# Patient Record
Sex: FEMALE | Race: WHITE | NOT HISPANIC OR LATINO | Employment: FULL TIME | URBAN - NONMETROPOLITAN AREA
[De-identification: names, ages, dates, MRNs, and addresses within clinical notes are randomized per-mention and may not be internally consistent; named-entity substitution may affect disease eponyms.]

---

## 2018-01-05 ENCOUNTER — CONVERSION ENCOUNTER (OUTPATIENT)
Dept: FAMILY MEDICINE CLINIC | Age: 38
End: 2018-01-05

## 2018-01-06 LAB — TSH SERPL-ACNC: 0.06 UIU/ML

## 2018-03-09 ENCOUNTER — CONVERSION ENCOUNTER (OUTPATIENT)
Dept: FAMILY MEDICINE CLINIC | Age: 38
End: 2018-03-09

## 2018-03-10 LAB — TSH SERPL-ACNC: <0.006 UIU/ML

## 2018-04-20 ENCOUNTER — CONVERSION ENCOUNTER (OUTPATIENT)
Dept: FAMILY MEDICINE CLINIC | Age: 38
End: 2018-04-20

## 2018-04-21 LAB — TSH SERPL-ACNC: 0.04 UIU/ML

## 2018-05-25 ENCOUNTER — CONVERSION ENCOUNTER (OUTPATIENT)
Dept: FAMILY MEDICINE CLINIC | Age: 38
End: 2018-05-25

## 2018-05-26 LAB
25(OH)D3 SERPL-MCNC: 35 NG/ML
TSH SERPL-ACNC: 0.01 UIU/ML

## 2018-06-29 ENCOUNTER — OFFICE VISIT CONVERTED (OUTPATIENT)
Dept: FAMILY MEDICINE CLINIC | Age: 38
End: 2018-06-29
Attending: NURSE PRACTITIONER

## 2018-07-30 ENCOUNTER — CONVERSION ENCOUNTER (OUTPATIENT)
Dept: FAMILY MEDICINE CLINIC | Age: 38
End: 2018-07-30

## 2018-07-31 LAB — TSH SERPL-ACNC: 0.01 UIU/ML

## 2018-09-28 ENCOUNTER — CONVERSION ENCOUNTER (OUTPATIENT)
Dept: FAMILY MEDICINE CLINIC | Age: 38
End: 2018-09-28

## 2018-09-29 LAB — TSH SERPL-ACNC: 0.02 UIU/ML

## 2018-11-20 ENCOUNTER — CONVERSION ENCOUNTER (OUTPATIENT)
Dept: FAMILY MEDICINE CLINIC | Age: 38
End: 2018-11-20

## 2018-11-21 LAB — TSH SERPL-ACNC: 41.78 UIU/ML

## 2018-12-07 ENCOUNTER — OFFICE VISIT CONVERTED (OUTPATIENT)
Dept: FAMILY MEDICINE CLINIC | Age: 38
End: 2018-12-07
Attending: NURSE PRACTITIONER

## 2019-01-04 ENCOUNTER — HOSPITAL ENCOUNTER (OUTPATIENT)
Dept: OTHER | Facility: HOSPITAL | Age: 39
Discharge: HOME | End: 2019-01-04

## 2019-02-01 ENCOUNTER — OFFICE VISIT CONVERTED (OUTPATIENT)
Dept: FAMILY MEDICINE CLINIC | Age: 39
End: 2019-02-01
Attending: NURSE PRACTITIONER

## 2019-02-01 ENCOUNTER — HOSPITAL ENCOUNTER (OUTPATIENT)
Dept: OTHER | Facility: HOSPITAL | Age: 39
Discharge: HOME | End: 2019-02-01

## 2019-02-01 LAB
ALBUMIN SERPL-MCNC: 4.3 G/DL (ref 3.5–5)
ALBUMIN/GLOB SERPL: 1.2 {RATIO} (ref 1.4–2.6)
ALP SERPL-CCNC: 57 U/L (ref 42–98)
ALT SERPL-CCNC: 22 U/L (ref 10–40)
ANION GAP SERPL CALC-SCNC: 17 MMOL/L (ref 8–19)
AST SERPL-CCNC: 21 U/L (ref 15–50)
BASOPHILS # BLD MANUAL: 0.07 10*3/UL (ref 0–0.2)
BASOPHILS NFR BLD MANUAL: 1.1 % (ref 0–3)
BILIRUB SERPL-MCNC: 0.35 MG/DL (ref 0.2–1.3)
BUN SERPL-MCNC: 9 MG/DL (ref 5–25)
BUN/CREAT SERPL: 9 {RATIO} (ref 6–20)
CALCIUM SERPL-MCNC: 10.5 MG/DL (ref 8.7–10.4)
CHLORIDE SERPL-SCNC: 102 MMOL/L (ref 99–111)
CONV CO2: 25 MMOL/L (ref 22–32)
CONV TOTAL PROTEIN: 7.9 G/DL (ref 6.3–8.2)
CREAT UR-MCNC: 0.99 MG/DL (ref 0.5–0.9)
DEPRECATED RDW RBC AUTO: 46.4 FL
EOSINOPHIL # BLD MANUAL: 0.31 10*3/UL (ref 0–0.7)
EOSINOPHIL NFR BLD MANUAL: 4.8 % (ref 0–7)
ERYTHROCYTE [DISTWIDTH] IN BLOOD BY AUTOMATED COUNT: 15.1 % (ref 11.5–14.5)
GFR SERPLBLD BASED ON 1.73 SQ M-ARVRAT: >60 ML/MIN/{1.73_M2}
GLOBULIN UR ELPH-MCNC: 3.6 G/DL (ref 2–3.5)
GLUCOSE SERPL-MCNC: 92 MG/DL (ref 65–99)
GRANS (ABSOLUTE): 2.91 10*3/UL (ref 2–8)
GRANS: 45.4 % (ref 30–85)
HBA1C MFR BLD: 13.3 G/DL (ref 12–16)
HCT VFR BLD AUTO: 40.4 % (ref 37–47)
IMM GRANULOCYTES # BLD: 0.01 10*3/UL (ref 0–0.54)
IMM GRANULOCYTES NFR BLD: 0.2 % (ref 0–0.43)
LYMPHOCYTES # BLD MANUAL: 2.39 10*3/UL (ref 1–5)
LYMPHOCYTES NFR BLD MANUAL: 11.2 % (ref 3–10)
MCH RBC QN AUTO: 27.8 PG (ref 27–31)
MCHC RBC AUTO-ENTMCNC: 32.9 G/DL (ref 33–37)
MCV RBC AUTO: 84.3 FL (ref 81–99)
MONOCYTES # BLD AUTO: 0.72 10*3/UL (ref 0.2–1.2)
OSMOLALITY SERPL CALC.SUM OF ELEC: 286 MOSM/KG (ref 273–304)
PLATELET # BLD AUTO: 349 10*3/UL (ref 130–400)
PMV BLD AUTO: 9.4 FL (ref 7.4–10.4)
POTASSIUM SERPL-SCNC: 4.5 MMOL/L (ref 3.5–5.3)
RBC # BLD AUTO: 4.79 10*6/UL (ref 4.2–5.4)
SODIUM SERPL-SCNC: 139 MMOL/L (ref 135–147)
TSH SERPL-ACNC: 15.66 M[IU]/L (ref 0.27–4.2)
VARIANT LYMPHS NFR BLD MANUAL: 37.3 % (ref 20–45)
WBC # BLD AUTO: 6.41 10*3/UL (ref 4.8–10.8)

## 2019-02-05 LAB
CHOLEST SERPL-MCNC: 245 MG/DL (ref 107–200)
CHOLEST/HDLC SERPL: 7.7 {RATIO} (ref 3–6)
HDLC SERPL-MCNC: 32 MG/DL (ref 40–60)
LDLC SERPL CALC-MCNC: 156 MG/DL (ref 70–100)
TRIGL SERPL-MCNC: 285 MG/DL (ref 40–150)
VLDLC SERPL-MCNC: 57 MG/DL (ref 5–37)

## 2019-03-29 ENCOUNTER — HOSPITAL ENCOUNTER (OUTPATIENT)
Dept: OTHER | Facility: HOSPITAL | Age: 39
Discharge: HOME | End: 2019-03-29

## 2019-03-29 LAB — TSH SERPL-ACNC: 0.22 M[IU]/L (ref 0.27–4.2)

## 2019-05-29 ENCOUNTER — HOSPITAL ENCOUNTER (OUTPATIENT)
Dept: OTHER | Facility: HOSPITAL | Age: 39
Discharge: HOME | End: 2019-05-29

## 2019-05-29 LAB — TSH SERPL-ACNC: 0.18 M[IU]/L (ref 0.27–4.2)

## 2019-07-12 ENCOUNTER — HOSPITAL ENCOUNTER (OUTPATIENT)
Dept: OTHER | Facility: HOSPITAL | Age: 39
Discharge: HOME | End: 2019-07-12

## 2019-07-12 LAB — TSH SERPL-ACNC: 1.23 M[IU]/L (ref 0.27–4.2)

## 2019-10-28 ENCOUNTER — HOSPITAL ENCOUNTER (OUTPATIENT)
Dept: OTHER | Facility: HOSPITAL | Age: 39
Discharge: HOME | End: 2019-10-28

## 2019-10-28 ENCOUNTER — OFFICE VISIT CONVERTED (OUTPATIENT)
Dept: FAMILY MEDICINE CLINIC | Age: 39
End: 2019-10-28
Attending: NURSE PRACTITIONER

## 2019-10-28 LAB — TSH SERPL-ACNC: 2.49 M[IU]/L (ref 0.27–4.2)

## 2020-01-17 ENCOUNTER — OFFICE VISIT CONVERTED (OUTPATIENT)
Dept: FAMILY MEDICINE CLINIC | Age: 40
End: 2020-01-17
Attending: NURSE PRACTITIONER

## 2020-01-29 ENCOUNTER — HOSPITAL ENCOUNTER (OUTPATIENT)
Dept: OTHER | Facility: HOSPITAL | Age: 40
Discharge: HOME | End: 2020-01-29
Attending: NURSE PRACTITIONER

## 2020-01-29 LAB
25(OH)D3 SERPL-MCNC: 43.5 NG/ML (ref 30–100)
ALBUMIN SERPL-MCNC: 4 G/DL (ref 3.5–5)
ALBUMIN/GLOB SERPL: 1.3 {RATIO} (ref 1.4–2.6)
ALP SERPL-CCNC: 51 U/L (ref 42–98)
ALT SERPL-CCNC: 21 U/L (ref 10–40)
ANION GAP SERPL CALC-SCNC: 16 MMOL/L (ref 8–19)
AST SERPL-CCNC: 17 U/L (ref 15–50)
BILIRUB SERPL-MCNC: 0.33 MG/DL (ref 0.2–1.3)
BUN SERPL-MCNC: 9 MG/DL (ref 5–25)
BUN/CREAT SERPL: 9 {RATIO} (ref 6–20)
CALCIUM SERPL-MCNC: 10.8 MG/DL (ref 8.7–10.4)
CHLORIDE SERPL-SCNC: 101 MMOL/L (ref 99–111)
CONV CO2: 23 MMOL/L (ref 22–32)
CONV TOTAL PROTEIN: 7 G/DL (ref 6.3–8.2)
CREAT UR-MCNC: 1.02 MG/DL (ref 0.5–0.9)
GFR SERPLBLD BASED ON 1.73 SQ M-ARVRAT: >60 ML/MIN/{1.73_M2}
GLOBULIN UR ELPH-MCNC: 3 G/DL (ref 2–3.5)
GLUCOSE SERPL-MCNC: 107 MG/DL (ref 65–99)
OSMOLALITY SERPL CALC.SUM OF ELEC: 281 MOSM/KG (ref 273–304)
POTASSIUM SERPL-SCNC: 4 MMOL/L (ref 3.5–5.3)
SODIUM SERPL-SCNC: 136 MMOL/L (ref 135–147)
T4 FREE SERPL-MCNC: 0.6 NG/DL (ref 0.9–1.8)
TSH SERPL-ACNC: 12.13 M[IU]/L (ref 0.27–4.2)

## 2020-01-31 LAB
ASO AB SERPL-ACNC: 242 [IU]/ML (ref 0–200)
CONV RHEUMATOID FACTOR IGM: <10 [IU]/ML (ref 0–14)
CRP SERPL-MCNC: 1.2 MG/L (ref 0–5)
DSDNA AB SER-ACNC: NEGATIVE [IU]/ML
ENA AB SER IA-ACNC: NEGATIVE {RATIO}
ERYTHROCYTE [SEDIMENTATION RATE] IN BLOOD: 13 MM/H (ref 0–20)
PHOSPHATE SERPL-MCNC: 2.9 MG/DL (ref 2.4–4.5)
URATE SERPL-MCNC: 6.3 MG/DL (ref 2.5–7.5)

## 2020-02-01 LAB — T3FREE SERPL-MCNC: 2.8 PG/ML (ref 2–4.4)

## 2020-02-07 ENCOUNTER — HOSPITAL ENCOUNTER (OUTPATIENT)
Dept: OTHER | Facility: HOSPITAL | Age: 40
Discharge: HOME | End: 2020-02-07
Attending: NURSE PRACTITIONER

## 2020-05-29 ENCOUNTER — OFFICE VISIT CONVERTED (OUTPATIENT)
Dept: FAMILY MEDICINE CLINIC | Age: 40
End: 2020-05-29
Attending: NURSE PRACTITIONER

## 2020-07-28 ENCOUNTER — HOSPITAL ENCOUNTER (OUTPATIENT)
Dept: OTHER | Facility: HOSPITAL | Age: 40
Discharge: HOME | End: 2020-07-28
Attending: DERMATOLOGY

## 2020-07-28 LAB
ALBUMIN SERPL-MCNC: 4 G/DL (ref 3.5–5)
ALBUMIN/GLOB SERPL: 1.3 {RATIO} (ref 1.4–2.6)
ALP SERPL-CCNC: 57 U/L (ref 42–98)
ALT SERPL-CCNC: 24 U/L (ref 10–40)
ANION GAP SERPL CALC-SCNC: 17 MMOL/L (ref 8–19)
ASO AB SERPL-ACNC: 197 [IU]/ML (ref 0–200)
AST SERPL-CCNC: 16 U/L (ref 15–50)
BILIRUB SERPL-MCNC: <0.15 MG/DL (ref 0.2–1.3)
BUN SERPL-MCNC: 7 MG/DL (ref 5–25)
BUN/CREAT SERPL: 7 {RATIO} (ref 6–20)
CALCIUM SERPL-MCNC: 10.9 MG/DL (ref 8.7–10.4)
CHLORIDE SERPL-SCNC: 101 MMOL/L (ref 99–111)
CHOLEST SERPL-MCNC: 238 MG/DL (ref 107–200)
CHOLEST/HDLC SERPL: 7.2 {RATIO} (ref 3–6)
CONV CO2: 23 MMOL/L (ref 22–32)
CONV TOTAL PROTEIN: 7 G/DL (ref 6.3–8.2)
CREAT UR-MCNC: 0.94 MG/DL (ref 0.5–0.9)
GFR SERPLBLD BASED ON 1.73 SQ M-ARVRAT: >60 ML/MIN/{1.73_M2}
GLOBULIN UR ELPH-MCNC: 3 G/DL (ref 2–3.5)
GLUCOSE SERPL-MCNC: 113 MG/DL (ref 65–99)
HDLC SERPL-MCNC: 33 MG/DL (ref 40–60)
LDLC SERPL CALC-MCNC: 150 MG/DL (ref 70–100)
OSMOLALITY SERPL CALC.SUM OF ELEC: 283 MOSM/KG (ref 273–304)
POTASSIUM SERPL-SCNC: 4.1 MMOL/L (ref 3.5–5.3)
PTH-INTACT SERPL-MCNC: 89.8 PG/ML (ref 11.1–79.5)
SODIUM SERPL-SCNC: 137 MMOL/L (ref 135–147)
T4 FREE SERPL-MCNC: 0.4 NG/DL (ref 0.9–1.8)
TRIGL SERPL-MCNC: 275 MG/DL (ref 40–150)
TSH SERPL-ACNC: 67.58 M[IU]/L (ref 0.27–4.2)
VLDLC SERPL-MCNC: 55 MG/DL (ref 5–37)

## 2020-07-29 LAB
BASOPHILS # BLD MANUAL: 0.08 10*3/UL (ref 0–0.2)
BASOPHILS NFR BLD MANUAL: 0.9 % (ref 0–3)
DEPRECATED RDW RBC AUTO: 44 FL
EOSINOPHIL # BLD MANUAL: 0.31 10*3/UL (ref 0–0.7)
EOSINOPHIL NFR BLD MANUAL: 3.6 % (ref 0–7)
ERYTHROCYTE [DISTWIDTH] IN BLOOD BY AUTOMATED COUNT: 14.8 % (ref 11.5–14.5)
GRANS (ABSOLUTE): 4.68 10*3/UL (ref 2–8)
GRANS: 53.8 % (ref 30–85)
HBA1C MFR BLD: 11.7 G/DL (ref 12–16)
HCT VFR BLD AUTO: 35.9 % (ref 37–47)
IMM GRANULOCYTES # BLD: 0.04 10*3/UL (ref 0–0.54)
IMM GRANULOCYTES NFR BLD: 0.5 % (ref 0–0.43)
LYMPHOCYTES # BLD MANUAL: 2.68 10*3/UL (ref 1–5)
LYMPHOCYTES NFR BLD MANUAL: 10.3 % (ref 3–10)
MCH RBC QN AUTO: 26.8 PG (ref 27–31)
MCHC RBC AUTO-ENTMCNC: 32.6 G/DL (ref 33–37)
MCV RBC AUTO: 82.2 FL (ref 81–99)
MONOCYTES # BLD AUTO: 0.89 10*3/UL (ref 0.2–1.2)
PLATELET # BLD AUTO: 421 10*3/UL (ref 130–400)
PMV BLD AUTO: 9.6 FL (ref 7.4–10.4)
RBC # BLD AUTO: 4.37 10*6/UL (ref 4.2–5.4)
T3FREE SERPL-MCNC: 2 PG/ML (ref 2–4.4)
VARIANT LYMPHS NFR BLD MANUAL: 30.9 % (ref 20–45)
WBC # BLD AUTO: 8.68 10*3/UL (ref 4.8–10.8)

## 2020-07-30 LAB — 25(OH)D3 SERPL-MCNC: 84.5 NG/ML (ref 30–100)

## 2020-09-16 ENCOUNTER — HOSPITAL ENCOUNTER (OUTPATIENT)
Dept: OTHER | Facility: HOSPITAL | Age: 40
Discharge: HOME | End: 2020-09-16
Attending: INTERNAL MEDICINE

## 2020-09-16 LAB
T4 FREE SERPL-MCNC: 1.3 NG/DL (ref 0.9–1.8)
TSH SERPL-ACNC: 13.55 M[IU]/L (ref 0.27–4.2)

## 2020-10-19 ENCOUNTER — HOSPITAL ENCOUNTER (OUTPATIENT)
Dept: OTHER | Facility: HOSPITAL | Age: 40
Discharge: HOME | End: 2020-10-19
Attending: FAMILY MEDICINE

## 2020-10-22 LAB — SARS-COV-2 RNA SPEC QL NAA+PROBE: NOT DETECTED

## 2020-12-23 ENCOUNTER — HOSPITAL ENCOUNTER (OUTPATIENT)
Dept: OTHER | Facility: HOSPITAL | Age: 40
Discharge: HOME | End: 2020-12-23
Attending: INTERNAL MEDICINE

## 2020-12-23 LAB
T4 FREE SERPL-MCNC: 1.2 NG/DL (ref 0.9–1.8)
TSH SERPL-ACNC: 2.68 M[IU]/L (ref 0.27–4.2)

## 2021-01-26 ENCOUNTER — OFFICE VISIT CONVERTED (OUTPATIENT)
Dept: FAMILY MEDICINE CLINIC | Age: 41
End: 2021-01-26
Attending: NURSE PRACTITIONER

## 2021-04-23 ENCOUNTER — OFFICE VISIT CONVERTED (OUTPATIENT)
Dept: FAMILY MEDICINE CLINIC | Age: 41
End: 2021-04-23
Attending: NURSE PRACTITIONER

## 2021-05-14 ENCOUNTER — OFFICE VISIT CONVERTED (OUTPATIENT)
Dept: FAMILY MEDICINE CLINIC | Age: 41
End: 2021-05-14
Attending: FAMILY MEDICINE

## 2021-05-18 NOTE — PROGRESS NOTES
Alysa Qiu  1980     Office/Outpatient Visit    Visit Date: Fri, May 14, 2021 03:55 pm    Provider: Ann Melvin MD (Assistant: Essie Pascual MA)    Location: [facility]        Electronically signed by Ann Melvin MD on  05/14/2021 05:26:28 PM                             Subjective:        CC: Mrs. Qiu is a 40 year old White female.  PAtient presents today to discuss med issues;         HPI: Alysa is here today to establish care with me.          She is on levothyroxine for hypothyroidism.  No heat/cold intolerance, no changes in hair or skin.  She is following with Dr. Dave Lord.  He is currently managing meds.        She is on spironolactone for acne.  That works pretty well for her.        She is on pantoprazole and sucralfate for GERD.  She is taking these just as needed.  Has not needed them regularly recently.  No N/V/D.  No abd pain.        She is on escitalopram and bupropion for depression.  She would like to go back down on the bupropion to 150 mg.  When she went up to the 300 mg dose, she started to notice some emotional numbing side effects.    ROS:     CONSTITUTIONAL:  Negative for fatigue and fever.      EYES:  Negative for blurred vision.      CARDIOVASCULAR:  Negative for chest pain and palpitations.      RESPIRATORY:  Negative for recent cough and dyspnea.      GASTROINTESTINAL:  Negative for abdominal pain, constipation, diarrhea, nausea and vomiting.      MUSCULOSKELETAL:  Negative for arthralgias and myalgias.      NEUROLOGICAL:  Negative for paresthesias and weakness.      PSYCHIATRIC:  Positive for anxiety and emotional numbing.   Negative for depression or sleep disturbance.          Past Medical History / Family History / Social History:         Last Reviewed on 5/14/2021 05:20 PM by Ann Melvin    Past Medical History:                 PAST MEDICAL HISTORY     UNREMARKABLE         GYNECOLOGICAL HISTORY:    Current method of contraception  is IUD;         CURRENT MEDICAL PROVIDERS: Endocrinologist dr cabezas         PREVENTIVE HEALTH MAINTENANCE             MAMMOGRAM: Done within last 2 years and results in are chart was last done 2020 with normal results     PAP SMEAR: was last done 2019         Surgical History:         Culposcopy 19;         Family History:         Positive for Hyperlipidemia ( father; mother ).          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 Qiu denies any current form of exercise.          Tobacco/Alcohol/Supplements:     Last Reviewed on 2021 05:20 PM by Ann Melvin    Tobacco: Current Smoker: She currently smokes every day, 1-5 cigarettes per day; 1/2 pack per day.          Substance Abuse History:     Last Reviewed on 2021 05:20 PM by Ann Melvin        Mental Health History:     Last Reviewed on 2021 05:20 PM by Ann Melvin        Communicable Diseases (eg STDs):     Last Reviewed on 2021 05:20 PM by Ann Melvin        Current Problems:     Last Reviewed on 2021 04:50 PM by Kate Ramsey    Hypothyroidism, unspecified    Vitamin D deficiency, unspecified    Dysthymic disorder    Acne vulgaris    Gastro-esophageal reflux disease without esophagitis    Vitamin B12 deficiency anemia due to intrinsic factor deficiency    Overweight    Other long term (current) drug therapy    Hyperlipidemia, unspecified    Vitamin B12 deficiency anemia, unspecified    Acute abdomen        Immunizations:     None        Allergies:     Last Reviewed on 2021 04:50 PM by Kate Ramsey    No Known Allergies.        Current Medications:     Last Reviewed on 2021 04:50 PM by Kate Ramsey    zinc 50 mg oral tablet    CalcidoL 200 mcg/mL (8,000 unit/mL) oral Drops    levothyroxine 150 mcg oral tablet [take 1 tablet (150 mcg) by oral route once daily]    spironolactone 50 mg oral  tablet [Take 1 tablet(s) by mouth daily]    escitalopram oxalate 20 mg oral tablet [TAKE ONE TABLET BY MOUTH DAILY]    buPROPion  mg oral Tablet, Extended Release 24 hr [take 1 tablet (300 mg) by oral route once daily]    Carafate 1 gram oral tablet [take 1 tablet (1 gram) by oral route 2 times per day on an empty stomach]    pantoprazole 40 mg oral tablet, delayed release (enteric coated) [take 1 tablet (40 mg) by oral route daily x  2 weeks]        Objective:        Vitals:         Current: 5/14/2021 4:30:09 PM    Ht:  5 ft, 7 in;  Wt: 199.8 lbs;  BMI: 31.3T: 97.5 F (temporal);  BP: 124/69 mm Hg (left arm, sitting);  P: 94 bpm (left arm (BP Cuff), sitting);  sCr: 0.94 mg/dL;  GFR: 89.94        Exams:     PHYSICAL EXAM:     GENERAL: vital signs recorded - well developed, well nourished;  well groomed;  no apparent distress;     EYES: extraocular movements intact; conjunctiva and cornea are normal; PERRLA;     RESPIRATORY: normal respiratory rate and pattern with no distress; normal breath sounds with no rales, rhonchi, wheezes or rubs;     CARDIOVASCULAR: normal rate; rhythm is regular;  no systolic murmur; no edema;     GASTROINTESTINAL: nontender; normal bowel sounds;     MUSCULOSKELETAL: normal gait; normal overall tone         Assessment:         E03.9   Hypothyroidism, unspecified       F33.1   Major depressive disorder, recurrent, moderate       K21.9   Gastro-esophageal reflux disease without esophagitis       L70.0   Acne vulgaris           ORDERS:         Meds Prescribed:       [Queued Refill] buPROPion  mg oral Tablet, Extended Release 24 hr [take 1 tablet (150 mg) by oral route once daily], #90 (ninety) tablets, Refills: 1 (one)         Lab Orders:       APPTO  Appointment need  (In-House)                      Plan:         Hypothyroidism, unspecifiedShe follows with Endo Dr. Dave Lord for this.  He is managing her meds.  Going to see him soon and planning to get labs at that time.  RTC  3 months.        She is overdue for pap smear.  She had a colposcopy in 2019 with [facility] and they recommended pap smear in 1 year, so we will get that set up for her.        FOLLOW-UP: Schedule a follow-up visit in 3 months.:.  WWE 30 min with Ngozi          Orders:       APPTO  Appointment need  (In-House)              Major depressive disorder, recurrent, moderateShe is having emotional numbing with the bupropion at 300 mg.  She would like to go back down to 150 mg (rx sent).  Continue escitalopram 20 mg daily.  She is also doing some other relaxation techniques at home to help with her anxiety and depression, and these do seem to be helping.  Continue to monitor.          Prescriptions:       [Queued Refill] buPROPion  mg oral Tablet, Extended Release 24 hr [take 1 tablet (150 mg) by oral route once daily], #90 (ninety) tablets, Refills: 1 (one)         Gastro-esophageal reflux disease without esophagitisDoing much better.  She is now down to prn use of the pantoprazole 40 mg daily and sucralfate 1 gm.  No refills needed.        Acne vulgarisStable on spironolactone 50 mg daily.  Plan to check labs at E in 3 months.            Patient Recommendations:        For  Hypothyroidism, unspecified:    Schedule a follow-up visit in 3 months.                APPOINTMENT INFORMATION:        Monday Tuesday Wednesday Thursday Friday Saturday Sunday            Time:___________________AM  PM   Date:_____________________             Charge Capture:         Primary Diagnosis:     E03.9  Hypothyroidism, unspecified           Orders:      [id number]  Office/outpatient visit; established patient, level 4  (In-House)            APPTO  Appointment need  (In-House)              F33.1  Major depressive disorder, recurrent, moderate     K21.9  Gastro-esophageal reflux disease without esophagitis     L70.0  Acne vulgaris

## 2021-05-18 NOTE — PROGRESS NOTES
Alysa Qiu 1980     Office/Outpatient Visit    Visit Date: Mon, Oct 28, 2019 03:44 pm    Provider: Alice Mistry N.P. (Assistant: Prema Etienne MA)    Location: [facility]        Electronically signed by Alice Mistry N.P. on  11/08/2019 10:44:53 AM                             SUBJECTIVE:        CC: (NOT TAKING DIAZEPAM)     Mrs. Qiu is a 39 year old White female.  This is a follow-up visit.  check up, medication refills;         HPI:         Patient to be evaluated for hypothyroidism.  Pt states doing well on med. Here for f/u and refills.          In regard to the depression with anxiety, pt states her son committed suicide recently and she has been depressed over it. He lived with his dad. She is now questioning everything and her decisions with parenting him. She is currently in counseling.  However, she has been off work. She is concerned about going back and when to go back. She works for [employer] who has been great for her. Pt states knowing that some work will be good for her to keep her mind off things. Her mom also works there and can oversee her.     ROS:     CONSTITUTIONAL:  Negative for chills, fatigue, fever, and weight change.      EYES:  Negative for blurred vision.      CARDIOVASCULAR:  Negative for chest pain, orthopnea, paroxysmal nocturnal dyspnea and pedal edema.      RESPIRATORY:  Negative for dyspnea.      GASTROINTESTINAL:  Negative for abdominal pain, constipation, diarrhea, nausea and vomiting.      NEUROLOGICAL:  Negative for dizziness, headaches, paresthesias, and weakness.      PSYCHIATRIC:  Positive for anxiety, crying spells and depression.          PMH/FMH/SH:     Last Reviewed on 2/01/2019 01:15 PM by Alice Mistry    Past Medical History:                 PAST MEDICAL HISTORY     UNREMARKABLE         PREVENTIVE HEALTH MAINTENANCE             MAMMOGRAM: Done within last 2 years and results in are chart was last done 1/2019 with normal  results         Surgical History:         Culposcopy 1-24-19;         Social History:     Occupation:  [employer];     Marital Status:      Mrs. Qiu denies any current form of exercise.          Tobacco/Alcohol/Supplements:     Last Reviewed on 2/01/2019 01:15 PM by Alice Mistry    Tobacco: Current Smoker: She currently smokes every day, 1/2 pack per day.          Substance Abuse History:     Last Reviewed on 2/01/2019 01:15 PM by Alice Mistry        Mental Health History:     Last Reviewed on 2/01/2019 01:15 PM by Alice Mistry        Communicable Diseases (eg STDs):     Last Reviewed on 2/01/2019 01:15 PM by Alice Mistry            Current Problems:     Last Reviewed on 2/01/2019 01:15 PM by Alice Mistry    Overweight     Vitamin B12 deficiency     Screening for hyperlipidemia     GERD     Low back pain     Acne     Depression with anxiety     Hashimoto's thyroiditis     Vitamin D deficiency, unspecified     Hypothyroidism     Screening for depression         Immunizations:     None        Allergies:     Last Reviewed on 2/01/2019 01:15 PM by Alice Mistry      No Known Drug Allergies.         Current Medications:     Last Reviewed on 2/01/2019 01:15 PM by Alice Mistry    Spironolactone 50mg Tablet Take 1 tablet(s) by mouth daily     Bupropion HCl 150mg Tablets, Extended Release 1 tab daily     Lexapro 10mg Tablet 1 tab daily     NP Thyroid 90mg Tablet Take 1 tablet(s) by mouth daily     Diazepam 5mg Tablet 1/2 pill tid prn anxiety.         OBJECTIVE:        Vitals:         Current: 10/28/2019 3:49:57 PM    Ht:  5 ft, 7 in;  Wt: 194.2 lbs;  BMI: 30.4    T: 97.9 F (oral);  BP: 125/72 mm Hg (left arm, sitting);  P: 79 bpm (left arm (BP Cuff), sitting);  sCr: 0.99 mg/dL;  GFR: 85.21        Exams:     PHYSICAL EXAM:     GENERAL: vital signs recorded - well developed, well nourished;  no apparent distress;     EYES: extraocular movements intact; conjunctiva and  cornea are normal; PERRLA;     E/N/T:  normal EACs, TMs, nasal/oral mucosa, teeth, gingiva, and oropharynx;     NECK: range of motion is normal; thyroid is non-palpable;     RESPIRATORY: normal respiratory rate and pattern with no distress; normal breath sounds with no rales, rhonchi, wheezes or rubs;     CARDIOVASCULAR: normal rate; rhythm is regular;  no systolic murmur; no edema;     GASTROINTESTINAL: nontender; normal bowel sounds; no organomegaly;     NEUROLOGICAL:  cranial nerves, motor and sensory function, reflexes, gait and coordination are all intact;     PSYCHIATRIC:  appropriate affect and demeanor; normal speech pattern; grossly normal memory;         ASSESSMENT:           244.9   E03.9  Hypothyroidism              DDx:     300.4   F34.1  Depression with anxiety              DDx:         ORDERS:         Meds Prescribed:       Refill of: Lexapro (Escitalopram Oxalate) 20mg Tablet 1 tab daily  #30 (Thirty) tablet(s) Refills: 0         Lab Orders:       [id number]  TSH - [facility] TSH  (Send-Out)                   PLAN:          Hypothyroidism     LABORATORY:  Labs ordered to be performed today include TSH.            Orders:       [id number]  TSH - [facility] TSH  (Send-Out)            Depression with anxiety Long discussion on situation and depression.     School/Work Excuse for Return to work Nov 12th           Prescriptions:       Refill of: Lexapro (Escitalopram Oxalate) 20mg Tablet 1 tab daily  #30 (Thirty) tablet(s) Refills: 0             CHARGE CAPTURE:           Primary Diagnosis:     244.9 Hypothyroidism            E03.9    Hypothyroidism, unspecified              Orders:          [id number]   Office/outpatient visit; established patient, level 3  (In-House)           300.4 Depression with anxiety            F34.1    Dysthymic disorder

## 2021-05-18 NOTE — PROGRESS NOTES
Alysa Qiu 1980     Office/Outpatient Visit    Visit Date: Fri, Feb 1, 2019 01:01 pm    Provider: Alice Mistry N.P. (Assistant: Prema Etienne MA)    Location: [facility]        Electronically signed by Alice Mistry N.P. on  02/18/2019 08:36:59 AM                             SUBJECTIVE:        CC: (NOT TAKING DIAZEPAM)     Mrs. Qiu is a 38 year old White female.  This is a follow-up visit.  prevenative exam. medication refills. Patient also states she has chest congestion, pressure and cough onset 4 days;         HPI:         Mrs. Qiu presents with health checkup.  Her last physical exam was 1 year ago.  Her last menstrual period was 1/2019.  She is not currently using any form of contraception.  She performs breast self-exams occasionally.    Preventative Health updated today.  She is current with her influenza immunization.  Mrs. Qiu has had an abnormal Pap smear in the past.  Tobacco:         PHQ-9 Depression Screening: Completed form scanned and in chart; Total Score 0 Alcohol Consumption Screening: Completed form scanned and in chart; Total Score 0         Dx with cough; states sinus congestion, headache, ear pain, sore throat. Taking nyquil and dayqul, clartin D, EmergenC and vitamin with no relief.          Hypothyroidism details; states doing well on med. Here for f/u and labs.      ROS:     CONSTITUTIONAL:  Negative for chills, fatigue, fever, and weight change.      EYES:  Negative for blurred vision.      CARDIOVASCULAR:  Negative for chest pain, orthopnea, paroxysmal nocturnal dyspnea and pedal edema.      RESPIRATORY:  Positive for recent cough.   Negative for dyspnea.      GASTROINTESTINAL:  Negative for abdominal pain, constipation, diarrhea, nausea and vomiting.      NEUROLOGICAL:  Negative for dizziness, headaches, paresthesias, and weakness.      PSYCHIATRIC:  Negative for anxiety, depression, and sleep disturbances.          PMH/FMH/SH:     Last  Reviewed on 2/01/2019 01:15 PM by Alice Mistry    Past Medical History:                 PAST MEDICAL HISTORY     UNREMARKABLE         PREVENTIVE HEALTH MAINTENANCE             MAMMOGRAM: Done within last 2 years and results in are chart was last done 1/2019 with normal results         Surgical History:     NONE         Social History:     Occupation:  [employer];     Marital Status:      Mrs. Qiu denies any current form of exercise.          Tobacco/Alcohol/Supplements:     Last Reviewed on 2/01/2019 01:15 PM by Alice Mistry    Tobacco: Current Smoker: She currently smokes every day, 1/2 pack per day.          Substance Abuse History:     Last Reviewed on 2/01/2019 01:15 PM by Alice Mistry        Mental Health History:     Last Reviewed on 2/01/2019 01:15 PM by Alice Mistry        Communicable Diseases (eg STDs):     Last Reviewed on 2/01/2019 01:15 PM by Alice Mistry            Current Problems:     Last Reviewed on 2/01/2019 01:15 PM by Alice Mistry    Overweight     Vitamin B12 deficiency     Screening for hyperlipidemia     GERD     Low back pain     Acne     Depression with anxiety     Hashimoto's thyroiditis     Vitamin D deficiency, unspecified     Hypothyroidism     Cough     Screening for breast cancer, unspecified     Screening for depression         Immunizations:     None        Allergies:     Last Reviewed on 2/01/2019 01:15 PM by Alice Mistry      No Known Drug Allergies.         Current Medications:     Last Reviewed on 2/01/2019 01:15 PM by Alice Mistry    Spironolactone 50mg Tablet Take 1 tablet(s) by mouth daily     Bupropion HCl 150mg Tablets, Extended Release 1 tab daily     Lexapro 10mg Tablet 1 tab daily     Diazepam 5mg Tablet 1/2 pill tid prn anxiety.         OBJECTIVE:        Vitals:         Current: 2/1/2019 1:07:04 PM    Ht:  5 ft, 7 in;  Wt: 191.4 lbs;  BMI: 30.0    T: 98.7 F (oral);  BP: 112/71 mm Hg (left arm, sitting);  P:  78 bpm (left arm (BP Cuff), sitting);  sCr: 0.89 mg/dL;  GFR: 95.13        Exams:     PHYSICAL EXAM:     GENERAL: vital signs recorded - well developed, well nourished;  no apparent distress;     EYES: extraocular movements intact; conjunctiva and cornea are normal; PERRLA;     E/N/T: OROPHARYNX: oral mucosa reveals erythema;     NECK: range of motion is normal; thyroid is non-palpable;     RESPIRATORY: normal respiratory rate and pattern with no distress; normal breath sounds with no rales, rhonchi, wheezes or rubs;     CARDIOVASCULAR: normal rate; rhythm is regular;  no systolic murmur; no edema;     GASTROINTESTINAL: nontender; normal bowel sounds; no organomegaly;     NEUROLOGICAL:  cranial nerves, motor and sensory function, reflexes, gait and coordination are all intact;     PSYCHIATRIC:  appropriate affect and demeanor; normal speech pattern; grossly normal memory;         Lab/Test Results:             Influenza A and B:  Negative (02/01/2019),     Performed by::  tls (02/01/2019),             Procedures:     Bronchitis, acute     1. Kenalog 60 mg given IM in the right hip; administered by KG;  lot number [id number]; expires 08/2020             ASSESSMENT:           V70.0   Z00.00  Health checkup              DDx:     V79.0   Z13.89  Screening for depression              DDx:     466.0   J20.8  Bronchitis, acute              DDx:     244.9   E03.9  Hypothyroidism              DDx:         ORDERS:         Meds Prescribed:       Augmentin (Amoxicillin/Clavulanate) 875mg/125mg Tablet 1 tab bid X 10 days  #20 (Twenty) tablet(s) Refills: 0       Diflucan (Fluconazole) 150mg Tablet Take 1 tablet(s) by mouth on day #1, then 1 tablet on day #3  #2 (Two) tablet(s) Refills: 0         Lab Orders:       [id number]  Infectious agent antigen detection by immunoassay; Influenza  (In-House)         [id number]  Infectious agent antigen detection by immunoassay; Influenza  (In-House)         [id number]  [facility] CBC with 3 part diff   (Send-Out)         [id number]  COMP [facility] Comp. Metabolic Panel  (Send-Out)         [id number]  TSH [facility] TSH  (Send-Out)           Other Orders:       [id number]  Therapeutic injection  (In-House)           Negative EtOH screen  (In-House)           Calculated BMI above the upper parameter and a follow-up plan was documented in the medical record  (In-House)           Kenalog, per 10 mg (x6)                 PLAN:          Health checkup     MIPS PHQ-9 Depression Screening Completed form scanned and in chart; Total Score 0 Negative alcohol screen     BMI Elevated - Follow-Up Plan: She was provided education on weight loss strategies           Orders:         Negative EtOH screen  (In-House)           Calculated BMI above the upper parameter and a follow-up plan was documented in the medical record  (In-House)            Bronchitis, acute     Steroids Kenalog 60 mg 1.5 ml           Prescriptions:       Augmentin (Amoxicillin/Clavulanate) 875mg/125mg Tablet 1 tab bid X 10 days  #20 (Twenty) tablet(s) Refills: 0       Diflucan (Fluconazole) 150mg Tablet Take 1 tablet(s) by mouth on day #1, then 1 tablet on day #3  #2 (Two) tablet(s) Refills: 0           Orders:       [id number]  Infectious agent antigen detection by immunoassay; Influenza  (In-House)         [id number]  Infectious agent antigen detection by immunoassay; Influenza  (In-House)         [id number]  Therapeutic injection  (In-House)                     Kenalog, per 10 mg (x6)          Hypothyroidism     LABORATORY:  Labs ordered to be performed today include CBC, Comprehensive metabolic panel, and TSH.            Orders:       [id number]  [facility] CBC with 3 part diff  (Send-Out)         48702  COMP [facility] Comp. Metabolic Panel  (Send-Out)         [id number]  [facility] TSH  (Send-Out)               CHARGE CAPTURE:           Primary Diagnosis:     V70.0 Health checkup            Z00.00    Encounter for general adult medical examination without abnormal findings               Orders:          [id number]   Preventive medicine, established patient, age 40-64 years  (In-House)                Negative EtOH screen  (In-House)                Calculated BMI above the upper parameter and a follow-up plan was documented in the medical record  (In-House)           V79.0 Screening for depression            Z13.89    Encounter for screening for other disorder              Orders:          [id number]  Office/outpatient visit; established patient, level 3  (In-House)           466.0 Bronchitis, acute            J20.8    Acute bronchitis due to other specified organisms              Orders:          [id number]  Infectious agent antigen detection by immunoassay; Influenza  (In-House)             [id number]   Infectious agent antigen detection by immunoassay; Influenza  (In-House)             14434   Therapeutic injection  (In-House)                                           Kenalog, per 10 mg (x6)         244.9 Hypothyroidism            E03.9    Hypothyroidism, unspecified

## 2021-05-18 NOTE — PROGRESS NOTES
Alysa Qiu 1980     Office/Outpatient Visit    Visit Date: Fri, Jun 29, 2018 09:24 am    Provider: Alice Mistry N.P. (Assistant: Dilia Sarmiento MA)    Location: [facility]        Electronically signed by Alice Mistry N.P. on  07/03/2018 01:01:54 AM                             SUBJECTIVE:        CC:     Mrs. Qiu is a 37 year old White female.  Med refills;         HPI:         Patient to be evaluated for hypothyroidism.  Pt states doing well on med. Here for f/u and refills.          Concerning depression with anxiety, pt states doing well on med. Here for f/u and refills. States taking diazepam on occassion. Requesting refill. She has not used it in months. However, when discussing drug screen, pt admits to smoking marijuana and that it would show on her report. Therefore, she opts not to do the drug screen or get the med refilled.      ROS:     CONSTITUTIONAL:  Negative for chills, fatigue, fever, and weight change.      EYES:  Negative for blurred vision.      CARDIOVASCULAR:  Negative for chest pain, orthopnea, paroxysmal nocturnal dyspnea and pedal edema.      RESPIRATORY:  Negative for dyspnea.      GASTROINTESTINAL:  Negative for abdominal pain, constipation, diarrhea, nausea and vomiting.      NEUROLOGICAL:  Negative for dizziness, headaches, paresthesias, and weakness.      PSYCHIATRIC:  Negative for anxiety, depression, and sleep disturbances.          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 Qiu denies any current form of exercise.          Tobacco/Alcohol/Supplements:     Last Reviewed on 12/04/2017 08:38 AM by Tori Sarmiento    Tobacco: Current Smoker: She currently smokes every day, 1/2 pack per day.           Substance Abuse History:     Last Reviewed on 12/01/2017 01:31 PM by Alice Mistry        Mental Health History:     Last Reviewed on 12/01/2017 01:31 PM by Alice Mistry        Communicable Diseases (eg STDs):     Last Reviewed on 12/01/2017 01:31 PM by Alice Mistry            Current Problems:     Last Reviewed on 12/01/2017 01:31 PM by Alice Mistry    Vitamin B12 deficiency     Screening for hyperlipidemia     GERD     Low back pain     Acne     Depression with anxiety     Hashimoto's thyroiditis     Vitamin D deficiency, unspecified     Hypothyroidism         Immunizations:     None        Allergies:     Last Reviewed on 6/29/2018 09:29 AM by Dilia Sarmiento      No Known Drug Allergies.         Current Medications:     Last Reviewed on 6/29/2018 09:29 AM by Dilia Sarmiento    Bupropion HCl 150mg Tablets, Extended Release 1 tab daily     Lexapro 10mg Tablet 1 tab daily     Spironolactone 50mg Tablet Take 1 tablet(s) by mouth daily     Diazepam 5mg Tablet 1/2 pill tid prn anxiety.     NP Thyroid 90mg Tablet 2 tabs daily         OBJECTIVE:        Vitals:         Current: 6/29/2018 9:28:02 AM    Ht:  5 ft, 7 in;  Wt: 191.5 lbs;  BMI: 30.0    T: 98 F (oral);  BP: 112/70 mm Hg (left arm, sitting);  P: 66 bpm (left arm (BP Cuff), sitting);  sCr: 0.89 mg/dL;  GFR: 96.08        Exams:     PHYSICAL EXAM:     GENERAL: vital signs recorded - well developed, well nourished;  no apparent distress;     EYES: extraocular movements intact; conjunctiva and cornea are normal; PERRLA;     NECK: range of motion is normal; thyroid is non-palpable;     RESPIRATORY: normal respiratory rate and pattern with no distress; normal breath sounds with no rales, rhonchi, wheezes or rubs;     CARDIOVASCULAR: normal rate; rhythm is regular;  no systolic murmur; no edema;     GASTROINTESTINAL: nontender; normal bowel sounds; no organomegaly;     NEUROLOGICAL:  cranial nerves, motor and sensory function, reflexes, gait and  coordination are all intact;     PSYCHIATRIC:  appropriate affect and demeanor; normal speech pattern; grossly normal memory;         ASSESSMENT:           244.9   E03.9  Hypothyroidism              DDx:     300.4   F34.1  Depression with anxiety              DDx:         ORDERS:         Meds Prescribed:       Refill of: NP Thyroid (Thyroid) 90mg Tablet 2 tabs daily  #46 (Forty Six) tablet(s) Refills: 0       Refill of: Spironolactone 50mg Tablet Take 1 tablet(s) by mouth daily  #23 (Twenty Three) tablet(s) Refills: 0       Refill of: Bupropion HCl (Bupropion HCl)  150mg Tablets, Extended Release 1 tab daily  #8 (Eight) tablet(s) Refills: 0         Lab Orders:       APPTO  Appointment need  (In-House)                   PLAN:          Hypothyroidism         FOLLOW-UP: Schedule a follow-up visit in 6 months..   Chronic visit follow up           Prescriptions:       Refill of: NP Thyroid (Thyroid) 90mg Tablet 2 tabs daily  #46 (Forty Six) tablet(s) Refills: 0       Refill of: Spironolactone 50mg Tablet Take 1 tablet(s) by mouth daily  #23 (Twenty Three) tablet(s) Refills: 0           Orders:       APPTO  Appointment need  (In-House)             Patient Education Handouts:       Hypothyroidism           Depression with anxiety           Prescriptions:       Refill of: Bupropion HCl (Bupropion HCl)  150mg Tablets, Extended Release 1 tab daily  #8 (Eight) tablet(s) Refills: 0             Patient Recommendations:        For  Hypothyroidism:     Schedule a follow-up visit in 6 months.                APPOINTMENT INFORMATION:        Monday Tuesday Wednesday Thursday Friday Saturday Sunday            Time:___________________AM  PM   Date:_____________________             CHARGE CAPTURE:           Primary Diagnosis:     244.9 Hypothyroidism            E03.9    Hypothyroidism, unspecified              Orders:          20430   Office/outpatient visit; established patient, level 3  (In-House)             APPTO   Appointment  need  (In-House)           300.4 Depression with anxiety            F34.1    Dysthymic disorder

## 2021-05-18 NOTE — PROGRESS NOTES
"Alysa Qiu  1980     Office/Outpatient Visit    Visit Date: Fri, Apr 23, 2021 01:07 pm    Provider: Kate Ramsey N.P. (Assistant: Minna Munson MA)    Location: [facility]        Electronically signed by Kate Ramsey N.P. on  04/23/2021 04:51:22 PM                             Subjective:        CC: Seen with LAURA Mason NP studentMrs. Lazarus is a 40 year old White female.  Stomach pains, intermittent. Started Wedneday;         HPI: Patient presents to clinic for left mid abdominal pain. She states that it started 2-3 days ago and describes it as dull and tender 6-7/10 at worse that is 1/10 currently. Her pain is not associated with eating.  She feels \"somewhat constipated\" as she normally has BMs 1-2 times per day and has been going once per day to every other day. Her last BM was yesterday and stool was somewhat hard and difficult to pass. She states that she had a similar episode in the past but it was worse then. This episode occurred last year and she was diagnosed with gastritis and given carafate which helped. She states that she drank alcohol heavily over the weekend  which is not typical of her. She states that her symptoms have considerably improved since onset. She has a history of acid reflux and she takes nexium daily. She had an appendectomy 12 years ago and still has gall bladder. She denies previous diagnosis of gall bladder disease.         She also has low back pain on her right side and states her urine is dark colored and stronger smelling than usual. Her last pap and pelvic was here about 2 years ago. She has not had a period and does not think she is pregnant. She currently has an non-hormonal IUD that she states was implanted in 2008.    ROS:     CONSTITUTIONAL:  Negative for fatigue, fever and night sweats.      CARDIOVASCULAR:  Negative for chest pain, palpitations and pedal edema.      RESPIRATORY:  Negative for recent cough and dyspnea.      " GASTROINTESTINAL:  Positive for abdominal pain ( epigastric; LUQ ) and constipation.   Negative for diarrhea, nausea or vomiting.      GENITOURINARY:  Negative for dysuria and urinary incontinence.      PSYCHIATRIC:  Negative for anxiety, depression, sleep disturbance and suicidal thoughts.          Past Medical History / Family History / Social History:         Last Reviewed on 2021 04:50 PM by Kate Ramsey    Past Medical History:                 PAST MEDICAL HISTORY     UNREMARKABLE         GYNECOLOGICAL HISTORY:    Current method of contraception is IUD;         CURRENT MEDICAL PROVIDERS: Endocrinologist dr cabezas         PREVENTIVE HEALTH MAINTENANCE             MAMMOGRAM: Done within last 2 years and results in are chart was last done 2020 with normal results     PAP SMEAR: was last done 2019         Surgical History:         Culposcopy 19;         Family History:         Positive for Hyperlipidemia ( father; mother ).          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 Qiu denies any current form of exercise.          Tobacco/Alcohol/Supplements:     Last Reviewed on 2021 04:50 PM by Kate Ramsey    Tobacco: Current Smoker: She currently smokes every day, 1-5 cigarettes per day; 1/2 pack per day.          Substance Abuse History:     Last Reviewed on 2021 04:50 PM by Kate Ramsey        Mental Health History:     Last Reviewed on 2021 04:50 PM by Kate Ramsey        Communicable Diseases (eg STDs):     Last Reviewed on 2021 04:50 PM by Kate Ramsey        Immunizations:     None        Allergies:     Last Reviewed on 2021 04:50 PM by Kate Ramsey    No Known Allergies.        Current Medications:     Last Reviewed on 2021 04:50 PM by Kate Ramsey    zinc 50 mg oral tablet    CalcidoL 200 mcg/mL (8,000 unit/mL) oral Drops     levothyroxine 150 mcg oral tablet [take 1 tablet (150 mcg) by oral route once daily]    spironolactone 50 mg oral tablet [Take 1 tablet(s) by mouth daily]    escitalopram oxalate 20 mg oral tablet [TAKE ONE TABLET BY MOUTH DAILY]    buPROPion  mg oral Tablet, Extended Release 24 hr [take 1 tablet (300 mg) by oral route once daily]    Carafate 1 gram oral tablet [take 1 tablet (1 gram) by oral route 2 times per day on an empty stomach]    pantoprazole 40 mg oral tablet, delayed release (enteric coated) [take 1 tablet (40 mg) by oral route daily x  2 weeks]        Objective:        Vitals:         Current: 4/23/2021 1:16:05 PM    Ht:  5 ft, 7 in;  Wt: 201.6 lbs;  BMI: 31.6T: 97.1 F (temporal);  BP: 104/67 mm Hg (left arm, sitting);  P: 66 bpm (left arm (BP Cuff), sitting);  sCr: 0.94 mg/dL;  GFR: 90.28        Exams:     PHYSICAL EXAM:     GENERAL: vital signs recorded - well developed, well nourished;  well groomed;  no apparent distress;     RESPIRATORY: normal respiratory rate and pattern with no distress; normal breath sounds with no rales, rhonchi, wheezes or rubs;     CARDIOVASCULAR: normal rate; rhythm is regular;  no systolic murmur; no edema;     GASTROINTESTINAL: moderate diffuse, epigastric, and LUQ pain;  normal bowel sounds; no organomegaly; no masses;     MUSCULOSKELETAL: normal gait; normal overall tone     NEUROLOGIC: mental status: alert and oriented x 3;     PSYCHIATRIC:  appropriate affect and demeanor; normal speech pattern; grossly normal memory;         Lab/Test Results:         Pregnancy Test, Urine: negative (04/23/2021),     Performed by:: atc (04/23/2021),     Glucose, Urine: Neg (04/23/2021),     Bilirubin, urine: Negative (04/23/2021),     Ketones, Urine Strip: Negative (04/23/2021),     Specific Gravity, urine: 1.030 (04/23/2021),     Blood in Urine: negative (04/23/2021),     pH, urine: 5.5 (04/23/2021),     Protein Urine QL: negative (04/23/2021),     Urobilinogen, urine: 0.2  E.U./dL (04/23/2021),     Nitrite, Urine: Negative (04/23/2021),     Leukoctyes, urine: Small (04/23/2021),     Appearance: Slightly Cloudy (04/23/2021),     collection source: Clean-catch (04/23/2021),     Color: Yellow (04/23/2021),             Assessment:         R10.0   Acute abdomen           ORDERS:         Lab Orders:       [id number]  Urinalysis, automated, without microscopy  (In-House)            54311  [facility] - [facility] HCG Pregnancy Urine Qualitative  (In-House)                      Plan:         Acute abdomenUA and pregnancy test were unrevealing. Likely due to excessive EtOH consumption over the weekend based on history and exam findings. Other etiologies such as GERD, gall bladder disease, or H.pylori are in the differential. She reports that she feels her symptoms are much better now so will defer further workup until symptoms worsen or persist and will prescribe short courses of prilosec and carafate and monitor for improvement. She will call with worsening symptoms or persistent symptoms >1 week. Pt voiced understanding and has no further questions at this time.    LABORATORY:  Labs ordered to be performed today include urine pregnancy test and UA dip in office no micro.            Prescriptions: carafate and protonix to be used for next 2 weeks. F/u as needed.           Orders:       [id number]  Urinalysis, automated, without microscopy  (In-House)            92260  [facility] - [facility] HCG Pregnancy Urine Qualitative  (In-House)                  Charge Capture:         Primary Diagnosis:     R10.0  Acute abdomen           Orders:      27728  Office/outpatient visit; established patient, level 3  (In-House)            33475  Urinalysis, automated, without microscopy  (In-House)            84864  [facility] - [facility] HCG Pregnancy Urine Qualitative  (In-House)

## 2021-05-18 NOTE — PROGRESS NOTES
Alysa Qiu 1980     Office/Outpatient Visit    Visit Date: Fri, Dec 7, 2018 09:45 am    Provider: Alice Mistry N.P. (Assistant: Essie Pascual MA)    Location: [facility]        Electronically signed by Alice Mistry N.P. on  12/07/2018 04:46:09 PM                             SUBJECTIVE:        CC:     Mrs. Qiu is a 38 year old White female.  Patient presents today for well woman exam, also wants to discuss medications;         HPI:         Mrs. Qiu is here for routine periodic health screening and examination.  She cannot recall when she last had a physical exam.  Her last menstrual period was 11/7/2018.  Her current method of contraception is an IUD.  She performs breast self-exams occasionally.  Her last Pap smear was in 06/19/2014 and was normal.   Her last mammogram was in 06/27/2014 and was normal.  Mrs. Qiu denies any history of abnormal Pap smears.              PHQ-9 Depression Screening: Completed form scanned and in chart; Total Score 4 Alcohol Consumption Screening: Completed form scanned and in chart; Total Score 3         Additionally, she presents with history of hashimoto's thyroiditis.  pt states she was cut back on her thyroid med by Dr. Wilburn. She experienced symptoms of fatigue and hair loss. States feeling like she was in a bad state and took everything she had just to go to work. She repeated the tsh which was 41. She was then put back on her regular dose and now feeling great.  She is ok on refills now.      ROS:     CONSTITUTIONAL:  Negative for chills, fatigue, fever, and weight change.      EYES:  Negative for blurred vision, eye pain, and photophobia.      E/N/T:  Negative for hearing problems, E/N/T pain, congestion, rhinorrhea, epistaxis, hoarseness, and dental problems.      CARDIOVASCULAR:  Negative for chest pain, palpitations, tachycardia, orthopnea, and edema.      RESPIRATORY:  Negative for cough, dyspnea, and hemoptysis.       GASTROINTESTINAL:  Negative for abdominal pain, heartburn, constipation, diarrhea, and stool changes.      GENITOURINARY:  Negative for genital lesions, hematuria, menstrual problems, polyuria, abnormal vaginal bleeding, and vaginal discharge.      MUSCULOSKELETAL:  Negative for arthralgias, back pain, and myalgias.      INTEGUMENTARY/BREAST:  Negative for atypical moles, dry skin, pruritis, rashes, breast masses, and nipple discharge.      NEUROLOGICAL:  Negative for dizziness, headaches, paresthesias, and weakness.      ENDOCRINE:  Negative for hair loss, heat/cold intolerance, polydipsia, and polyphagia.      ALLERGIC/IMMUNOLOGIC:  Negative for allergies, frequent illnesses, HIV exposure, and urticaria.      PSYCHIATRIC:  Negative for anxiety, depression, and sleep disturbances.          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 Qiu denies any current form of exercise.          Tobacco/Alcohol/Supplements:     Last Reviewed on 12/07/2018 10:40 AM by Alice Mistry    Tobacco: Current Smoker: She currently smokes every day, 1/2 pack per day.          Substance Abuse History:     Last Reviewed on 12/07/2018 10:40 AM by Alice Mistry        Mental Health History:     Last Reviewed on 12/07/2018 10:40 AM by Alice Mistry        Communicable Diseases (eg STDs):     Last Reviewed on 12/07/2018 10:40 AM by Alice Mistry            Current Problems:     Last Reviewed on 12/07/2018 10:40 AM by Alice Mistry    Overweight     Vitamin B12 deficiency     Screening for hyperlipidemia     GERD     Low back pain     Acne     Depression with anxiety     Hashimoto's thyroiditis     Vitamin D deficiency, unspecified     Hypothyroidism     Screening for breast  cancer, unspecified     Screening for depression         Immunizations:     None        Allergies:     Last Reviewed on 12/07/2018 10:40 AM by Alice Mistry      No Known Drug Allergies.         Current Medications:     Last Reviewed on 12/07/2018 10:40 AM by Alice Mistry    Spironolactone 50mg Tablet Take 1 tablet(s) by mouth daily     Bupropion HCl 150mg Tablets, Extended Release 1 tab daily     Lexapro 10mg Tablet 1 tab daily     Diazepam 5mg Tablet 1/2 pill tid prn anxiety.     NP Thyroid 90mg Tablet Take 1 tablet(s) by mouth daily         OBJECTIVE:        Vitals:         Current: 12/7/2018 9:53:04 AM    Ht:  5 ft, 7 in;  Wt: 190.8 lbs;  BMI: 29.9    T: 98.3 F (oral);  BP: 119/61 mm Hg (left arm, sitting);  P: 74 bpm (left arm (BP Cuff), sitting);  sCr: 0.89 mg/dL;  GFR: 95.00        Exams:     PHYSICAL EXAM:     GENERAL: vital signs recorded - well developed, well nourished;  no apparent distress;     EYES: extraocular movements intact; conjunctiva and cornea are normal; PERRLA;     E/N/T:  normal EACs, TMs, nasal/oral mucosa, teeth, gingiva, and oropharynx;     NECK: range of motion is normal; thyroid is non-palpable;     RESPIRATORY: normal respiratory rate and pattern with no distress; normal breath sounds with no rales, rhonchi, wheezes or rubs;     CARDIOVASCULAR: normal rate; rhythm is regular;  no systolic murmur; no edema;     GASTROINTESTINAL: nontender; normal bowel sounds; no organomegaly; rectal exam: no masses; guaiac NEGATIVE stool;     GENITOURINARY: Pap smear taken;  external genitalia: normal without lesions or urethral abnormalities;; vagina: normal mucosa; ;  cervix: normal appearance without lesions or discharge;;  adnexa: normal with no masses or tenderness     LYMPHATIC: no enlargement of cervical or facial nodes; no supraclavicular nodes;     BREAST/INTEGUMENT: BREASTS: breast exam is normal without masses, skin changes, or nipple discharge; SKIN: no significant rashes or  lesions; no suspicious moles; breast exam: no overlying skin changes; no breast masses;     MUSCULOSKELETAL:  Normal range of motion, strength and tone;     NEUROLOGICAL:  cranial nerves, motor and sensory function, reflexes, gait and coordination are all intact;     PSYCHIATRIC:  appropriate affect and demeanor; normal speech pattern; grossly normal memory;         ASSESSMENT:           V72.31     Well Woman Exam     V79.0   Z13.89  Screening for depression              DDx:     V76.10   Z12.39  Screening for breast cancer, unspecified              DDx:     245.2   E06.0  Hashimoto's thyroiditis              DDx:     278.00   E66.3  Overweight              DDx:         ORDERS:         Radiology/Test Orders:       3014F  Screening mammography results documented and reviewed (PV)1  (In-House)           Lab Orders:       [id number]  Cytopathology, slides, cervical or vaginal; manual screening under MD supervision  (Send-Out)         67665  CBC - [facility] CBC with 3 part diff  (Send-Out)         77762  COMP - [facility] Comp. Metabolic Panel  (Send-Out)         30132  LPDP - [facility] Lipid Panel  (Send-Out)         46576  TSH - [facility] TSH  (Send-Out)         12913  BDUAM - [facility] Urinalysis, automated, with micro  (Send-Out)           Other Orders:         Calculated BMI above the upper parameter and a follow-up plan was documented in the medical record  (In-House)           Negative EtOH screen  (In-House)                   PLAN:          Well Woman Exam     LABORATORY:  Labs ordered to be performed today include CBC, Comprehensive metabolic panel, lipid panel, TSH, and urinalysis with micro.  [facility] PHQ-9 Depression Screening Completed form scanned and in chart; Total Score 4 Negative alcohol screen     MAMMOGRAM: Done within last 2 years and results in are chart           Orders:      67660  Cytopathology, slides, cervical or vaginal; manual screening under MD supervision  (Send-Out)           Negative EtOH screen  (In-House)          3014F  Screening mammography results documented and reviewed (PV)1  (In-House)         58763  CBC - [facility] CBC with 3 part diff  (Send-Out)         34605  COMP - [facility] Comp. Metabolic Panel  (Send-Out)         11977  LPDP - [facility] Lipid Panel  (Send-Out)         19485  TSH - [facility] TSH  (Send-Out)         43905  UAM - [facility] Urinalysis, automated, with micro  (Send-Out)            Overweight     MIPS     BMI Elevated - Follow-Up Plan: She was provided education on weight loss strategies           Orders:         Calculated BMI above the upper parameter and a follow-up plan was documented in the medical record  (In-House)               CHARGE CAPTURE:           Primary Diagnosis:     V72.31    Well Woman Exam                Z01.419    Encounter for gynecological examination (general) (routine) without abnormal findings                       Orders:          45191   Preventive medicine, established patient, age 40-64 years  (In-House)                Negative EtOH screen  (In-House)             3014F   Screening mammography results documented and reviewed (PV)1  (In-House)           V79.0 Screening for depression            Z13.89    Encounter for screening for other disorder    V76.10 Screening for breast cancer, unspecified            Z12.39    Encounter for other screening for malignant neoplasm of breast    245.2 Hashimoto's thyroiditis            E06.0    Acute thyroiditis    278.00 Overweight            E66.3    Overweight              Orders:             Calculated BMI above the upper parameter and a follow-up plan was documented in the medical record  (In-House)               ADDENDUMS:      ____________________________________    Addendum: 12/20/2018 09:44 AM - Leandra Castellon         Visit Note Faxed to:        User Entered Recipient; Number [phone]

## 2021-05-18 NOTE — PROGRESS NOTES
Alysa Qiu  1980     Office/Outpatient Visit    Visit Date: Tue, Jan 26, 2021 11:11 am    Provider: Mariah Gilbert N.P. (Assistant: Ally Avalos LPN)    Location: [facility]        Electronically signed by Mariah Gilbert N.P. on  01/26/2021 10:30:44 PM                             Subjective:        CC: hasn't been taking Fish Oil Mrs. Qiu is a 40 year old White female.  [facility] [phone], medication refills/medication changes;         HPI: televideo visit          Patient to be evaluated for hypothyroidism, unspecified.  on levothyroxine 150 mcg daily per endocrinology dr tiffany cabezas last seen dec 2020 and had labs at [facility] with normal thyroid levels           level normal jan 2020           on bupropion 15 mg daily since 2015;  on lexapro since 2016 with 20 mg daily dose since late 2019.  having more problems with depression in the last one month and is sleeping more often.   needs to start counseling again.  has seen tristan nava previously who had advised her to consider genesight testing.  did not do well with effexor proviously-  made her feel weird .  also had no improvement with celxa previously.            With regard to the hyperlipidemia, unspecified, current treatment includes a low cholesterol/low fat diet.  Compliance with treatment has been fair.  She denies experiencing any hypercholesterolemia related symptoms.  had elevated tsh at time of last lipid panel.  has tried fish oil previously but made her feel sick to her stomach.  both parents with hyperlipidemia.            stable on spironoloactone.  denies side effects.  requests refills.      ROS:     CONSTITUTIONAL:  Positive for fatigue.   Negative for chills or fever.      CARDIOVASCULAR:  Negative for chest pain, palpitations, tachycardia, orthopnea, and edema.      RESPIRATORY:  Negative for cough, dyspnea, and hemoptysis.      MUSCULOSKELETAL:  Negative for arthralgias, back pain, and myalgias.       NEUROLOGICAL:  Negative for dizziness, headaches, paresthesias, and weakness.      ENDOCRINE:  Negative for hair loss, heat/cold intolerance, polydipsia, and polyphagia.      PSYCHIATRIC:  Positive for depression, anhedonia and hypersomnia.   Negative for anxiety, mood swings or suicidal thoughts.          Past Medical History / Family History / Social History:         Last Reviewed on 2021 12:23 PM by Mariah Gilbert    Past Medical History:                 PAST MEDICAL HISTORY     UNREMARKABLE         GYNECOLOGICAL HISTORY:    Current method of contraception is IUD;         CURRENT MEDICAL PROVIDERS: Endocrinologist dr cabezas         PREVENTIVE HEALTH MAINTENANCE             MAMMOGRAM: Done within last 2 years and results in are chart was last done 2020 with normal results     PAP SMEAR: was last done 2019         Surgical History:         Culposcopy 19;         Family History:         Positive for Hyperlipidemia ( father; mother ).          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 Qiu denies any current form of exercise.          Tobacco/Alcohol/Supplements:     Last Reviewed on 2021 11:12 AM by Ally Avalos    Tobacco: Current Smoker: She currently smokes every day, 1-5 cigarettes per day.          Substance Abuse History:     Last Reviewed on 10/28/2019 04:15 PM by Alice Mistry        Mental Health History:     Last Reviewed on 10/28/2019 04:15 PM by Alice Mistry        Communicable Diseases (eg STDs):     Last Reviewed on 10/28/2019 04:15 PM by Alice Mistry        Current Problems:     Last Reviewed on 2021 12:23 PM by Mariah Gilbert    Hypothyroidism, unspecified    Vitamin D deficiency, unspecified    Dysthymic disorder    Acne vulgaris    Gastro-esophageal reflux disease without esophagitis    Vitamin B12 deficiency anemia due to intrinsic factor deficiency     Overweight    Other long term (current) drug therapy    Hyperlipidemia, unspecified        Immunizations:     None        Allergies:     Last Reviewed on 5/29/2020 09:45 AM by Prema Etienne    No Known Allergies.        Current Medications:     Last Reviewed on 5/29/2020 09:45 AM by Prema Etienne    Fish Oil 1,000 mg (120 mg-180 mg) oral capsule    zinc 50 mg oral tablet    Bupropion HCl 150 mg oral Tablet, Extended Release 24 hr [1 tab daily]    spironolactone 50 mg oral tablet [Take 1 tablet(s) by mouth daily]    escitalopram oxalate 20 mg oral tablet [TAKE ONE TABLET BY MOUTH DAILY]    [place] Thyroid 120 mg oral tablet [TAKE ONE TABLET BY MOUTH DAILY 6 DAYS A WEEK SKIP ONE DAY]    Xanax 0.5 mg oral tablet [1 po BID prn]        Objective:        Exams:     PHYSICAL EXAM:     GENERAL: no apparent distress;     NEUROLOGIC: GROSSLY INTACT     PSYCHIATRIC:  appropriate affect and demeanor; normal speech pattern; grossly normal memory;         Assessment:         E03.9   Hypothyroidism, unspecified       E55.9   Vitamin D deficiency, unspecified       F34.1   Dysthymic disorder       E78.5   Hyperlipidemia, unspecified       D51.9   Vitamin B12 deficiency anemia, unspecified       L70.0   Acne vulgaris           ORDERS:         Meds Prescribed:       [New Rx] buPROPion  mg oral Tablet, Extended Release 24 hr [take 1 tablet (300 mg) by oral route once daily], #30 (thirty) tablets, Refills: 5 (five)       [Refilled] spironolactone 50 mg oral tablet [Take 1 tablet(s) by mouth daily], #90 (ninety) tablets, Refills: 1 (one)       [Refilled] escitalopram oxalate 20 mg oral tablet [TAKE ONE TABLET BY MOUTH DAILY], #90 (ninety) tablets, Refills: 1 (one)         Lab Orders:       11063  VITD - H Vitamin D, 25 Hydroxy  (Send-Out)            69997  BMP - [facility] Basic Metabolic Panel  (Send-Out)            51217  LPDP - [facility] Lipid Panel  (Send-Out)            79381  VB12 - [facility] Vitamin B12  (Send-Out)               Other Orders:       38344  [facility] Psychotropic  (Send-Out)                      Plan:         Hypothyroidism, unspecifiedmanaged by endocrinology    Telehealth: Verbal consent obtained for visit to occur via televideo conferencing         Vitamin D deficiency, unspecified    LABORATORY:  Labs ordered to be performed today include Vitamin D.            Orders:       38171  VITD - [facility] Vitamin D, 25 Hydroxy  (Send-Out)              Dysthymic disorder        increase dose of bupropion.  restart counseling.  checking coverage of genesight testing.  may return for genesight test.            Prescriptions:       [New Rx] buPROPion  mg oral Tablet, Extended Release 24 hr [take 1 tablet (300 mg) by oral route once daily], #30 (thirty) tablets, Refills: 5 (five)       [Refilled] escitalopram oxalate 20 mg oral tablet [TAKE ONE TABLET BY MOUTH DAILY], #90 (ninety) tablets, Refills: 1 (one)           Orders:       [id number]  [facility] Psychotropic  (Send-Out)              Hyperlipidemia, unspecified    LABORATORY:  Labs ordered to be performed today include basic metabolic panel and lipid panel.      RECOMMENDATIONS given include: exercise, low cholesterol/low fat diet, and has strong family hx of hyperlipidemia.  does not want to take statin medication.  can try krill oil supplement due to stomach upset on fish oil or red yeast rice and coenzyme q 10.  repeat fasting lipid panel q 2-3 months until cholesterol levels are normalized..            Orders:       [id number]  BMP - [facility] Basic Metabolic Panel  (Send-Out)            98021  LPDP - [facility] Lipid Panel  (Send-Out)              Vitamin B12 deficiency anemia, unspecified    LABORATORY:  Labs ordered to be performed today include B12.            Orders:       [id number]  VB12 - [facility] Vitamin B12  (Send-Out)              Acne vulgaris          Prescriptions:       [Refilled] spironolactone 50 mg oral tablet [Take 1 tablet(s) by mouth daily], #90 (ninety) tablets, Refills: 1 (one)              Patient Recommendations:        For  Hyperlipidemia, unspecified:    Maintain a regular exercise program. Reduce the amount of cholesterol and saturated fat in your diet.              Charge Capture:         Primary Diagnosis:     E03.9  Hypothyroidism, unspecified           Orders:      40283  Office/outpatient visit; established patient, level 4  (In-House)              E55.9  Vitamin D deficiency, unspecified     F34.1  Dysthymic disorder     E78.5  Hyperlipidemia, unspecified     D51.9  Vitamin B12 deficiency anemia, unspecified     L70.0  Acne vulgaris

## 2021-05-18 NOTE — PROGRESS NOTES
Alysa Qiu  1980     Office/Outpatient Visit    Visit Date: Fri, May 29, 2020 09:44 am    Provider: Mariah Gilbert N.P. (Assistant: Prema Etienne MA)    Location: [facility]        Electronically signed by Mariah Gilbert N.P. on  05/31/2020 11:02:50 PM                             Subjective:        CC: [phone] - dox audio Mrs. Qiu is a 39 year old White female.  This is a follow-up visit.  check up, medication refills.;         HPI: telephone visit due to covid 19          Patient presents with hypothyroidism, unspecified.  She is currently taking [place] thyroid.  TSH was last checked 4 months ago.  The result was reported as high.  She denies any related symptoms.  She reports no symptoms suggestive of adverse medication effect.            Concerning dysthymic disorder, stable on lexapro and bupropion.  not seeing tristan nava since covid 19 restrictions.  declined telehealth visit there.  denies side effects.  requests refills.            stable on spironolactone.  denies side effects.  requests refills.            Hyperlipidemia, unspecified details; current treatment includes fish oil and a low cholesterol/low fat diet.  does not want to take statins.      ROS:     CONSTITUTIONAL:  Negative for chills, fatigue, fever, and weight change.      CARDIOVASCULAR:  Negative for chest pain, palpitations, tachycardia, orthopnea, and edema.      RESPIRATORY:  Negative for cough, dyspnea, and hemoptysis.      MUSCULOSKELETAL:  Positive for arthralgias (improved since last visit.  completed amoxil for mild elevated ASO antibodies.).      NEUROLOGICAL:  Negative for dizziness, headaches, paresthesias, and weakness.      PSYCHIATRIC:  Positive for anxiety ( (stable) ) and feelings of stress.   Negative for depression, sleep disturbance or suicidal thoughts.          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 Qiu denies any current form of exercise.          Tobacco/Alcohol/Supplements:     Last Reviewed on 2020 01:53 PM by Spurling, Sarah C    Tobacco: Current Smoker: She currently smokes every day, 1-5 cigarettes per day.          Substance Abuse History:     Last Reviewed on 10/28/2019 04:15 PM by Alice Mistry        Mental Health History:     Last Reviewed on 10/28/2019 04:15 PM by Alice Mistry        Communicable Diseases (eg STDs):     Last Reviewed on 10/28/2019 04:15 PM by Alice Mistry        Current Problems:     Last Reviewed on 2020 03:20 PM by Mariah Gilbert    Hypothyroidism, unspecified    Acute thyroiditis    Vitamin D deficiency, unspecified    Dysthymic disorder    Acne vulgaris    Low back pain    Gastro-esophageal reflux disease without esophagitis    Vitamin B12 deficiency anemia due to intrinsic factor deficiency    Overweight    Other long term (current) drug therapy    Hypercalcemia    Hyperlipidemia, unspecified        Immunizations:     None        Allergies:     Last Reviewed on 2020 01:53 PM by Spurling, Sarah C    No Known Allergies.        Current Medications:     Last Reviewed on 2020 09:45 AM by Prema Etinene    Bupropion HCl 150 mg oral Tablet, Extended Release 24 hr [1 tab daily]    spironolactone 50 mg oral tablet [Take 1 tablet(s) by mouth daily]    escitalopram oxalate 20 mg oral tablet [TAKE ONE TABLET  BY MOUTH DAILY]    [place] Thyroid 120 mg oral tablet [TAKE ONE TABLET BY MOUTH DAILY 6 DAYS A WEEK SKIP ONE DAY]    Xanax 0.5 mg oral tablet [1 po BID prn]        Assessment:         E03.9   Hypothyroidism, unspecified       F34.1   Dysthymic disorder       L70.0   Acne vulgaris       E83.52   Hypercalcemia       E78.5   Hyperlipidemia, unspecified       M25.50   Pain in unspecified joint           ORDERS:         Meds Prescribed:       [Refilled] spironolactone 50 mg oral tablet [Take 1 tablet(s) by mouth daily], #90 (ninety) tablets, Refills: 1 (one)       [Refilled] escitalopram oxalate 20 mg oral tablet [TAKE ONE TABLET BY MOUTH DAILY], #90 (ninety) tablets, Refills: 1 (one)       [Refilled] Bupropion HCl 150 mg oral Tablet, Extended Release 24 hr [1 tab daily], #90 (ninety) tablets, Refills: 1 (one)       [Refilled] [place] Thyroid 120 mg oral tablet [TAKE ONE TABLET BY MOUTH DAILY 6 DAYS A WEEK SKIP ONE DAY], #90 (ninety) tablets, Refills: 1 (one)         Lab Orders:       FUTURE  Future order to be done at patients convenience  (Send-Out)            53171  TSH - [facility] TSH  (Send-Out)            FUTURE  Future order to be done at patients convenience  (Send-Out)            34225  COMP - [facility] Comp. Metabolic Panel  (Send-Out)            60717  PTHIN - [facility] PTH, intact  (Send-Out)            FUTURE  Future order to be done at patients convenience  (Send-Out)            20236  LPDP - [facility] Lipid Panel  (Send-Out)            FUTURE  Future order to be done at patients convenience  (Send-Out)            42268  ASOT - [facility] Antisteptolysin O Ab  (Send-Out)                      Plan:         Hypothyroidism, unspecified    Telehealth: Verbal consent obtained for visit to occur via phone call; Total time spent was 14 minutes; 83655--Jonyrjevp E/M 11-20 minutes     FOLLOW-UP TESTING #1: FOLLOW-UP LABORATORY:  Labs to be scheduled in the future include TSH.            Prescriptions:       [Refilled] [place] Thyroid 120 mg oral  tablet [TAKE ONE TABLET BY MOUTH DAILY 6 DAYS A WEEK SKIP ONE DAY], #90 (ninety) tablets, Refills: 1 (one)           Orders:       FUTURE  Future order to be done at patients convenience  (Send-Out)            48798  TSH - [facility] TSH  (Send-Out)              Dysthymic disorder          Prescriptions:       [Refilled] escitalopram oxalate 20 mg oral tablet [TAKE ONE TABLET BY MOUTH DAILY], #90 (ninety) tablets, Refills: 1 (one)       [Refilled] Bupropion HCl 150 mg oral Tablet, Extended Release 24 hr [1 tab daily], #90 (ninety) tablets, Refills: 1 (one)         Acne vulgaris          Prescriptions:       [Refilled] spironolactone 50 mg oral tablet [Take 1 tablet(s) by mouth daily], #90 (ninety) tablets, Refills: 1 (one)         Hypercalcemia        FOLLOW-UP TESTING #1: FOLLOW-UP LABORATORY:  Labs to be scheduled in the future include CMP and PTH intact.            Orders:       FUTURE  Future order to be done at patients convenience  (Send-Out)            64828  COMP - [facility] Comp. Metabolic Panel  (Send-Out)            86841  PTHIN [facility] PTH, intact  (Send-Out)              Hyperlipidemia, unspecified        FOLLOW-UP TESTING #1: FOLLOW-UP LABORATORY:  Labs to be scheduled in the future include lipid panel.            Orders:       FUTURE  Future order to be done at patients convenience  (Send-Out)            82911  LPDP [facility] Lipid Panel  (Send-Out)              Pain in unspecified joint        FOLLOW-UP TESTING #1: FOLLOW-UP LABORATORY:  Labs to be scheduled in the future include ASO T[place].            Orders:       FUTURE  Future order to be done at patients convenience  (Send-Out)            44985  ASOT - [facility] Antisteptolysin O Ab  (Send-Out)                  Patient Recommendations:        For  Hypothyroidism, unspecified:            The following laboratory testing has been ordered: TSH         For  Hypercalcemia:            The following laboratory testing has been ordered: metabolic panel, comprehensive         For   Hyperlipidemia, unspecified:            The following laboratory testing has been ordered: lipid panel         For  Pain in unspecified joint:            The following laboratory testing has been ordered:             Charge Capture:         Primary Diagnosis:     E03.9  Hypothyroidism, unspecified           Orders:      49430  Phys/HP telephone evaluation 11-20 minutes  (In-House)              F34.1  Dysthymic disorder     L70.0  Acne vulgaris     E83.52  Hypercalcemia     E78.5  Hyperlipidemia, unspecified     M25.50  Pain in unspecified joint

## 2021-05-18 NOTE — PROGRESS NOTES
Alysa Qiu  1980     Office/Outpatient Visit    Visit Date: Fri, Jan 17, 2020 01:52 pm    Provider: Frederick Gilbert N.P. (Assistant: Spurling, Sarah C, MA)    Location: [facility]        Electronically signed by Frederick Gilbert N.P. on  01/23/2020 08:56:45 AM                             Subjective:        CC: Mrs. Qiu is a 39 year old White female.  establishing care with [facility] today.;         HPI:           PHQ-9 Depression Screening: Completed form scanned and in chart; Total Score 20           With regard to the hypothyroidism, unspecified, she is currently taking NP thyroid 90 mg daily.  TSH was last checked 3 months ago.  The result was reported as normal.  Currently, she is experiencing depression and fatigue.  She reports no symptoms suggestive of adverse medication effect.            In regard to the dysthymic disorder, stable on lexapro and bupropion.  therapist tristan nava asks why is she still taking meds as they do not seem to help.  pt states she is fearful of stopping medication and wants to continue current regimen.  stressors include son's suicide in 2019.  she is back to work.  takes prn xanax.  her boss dr [facility] provided her last xanax rx.  does not take regularly.  is aware of addiction potential.  does not feel as if she can change anything about current regimen.            states spironolactone helps and requests refills.  denies side effects.            due for recheck     ROS:     CONSTITUTIONAL:  Positive for fatigue.   Negative for chills or fever.      CARDIOVASCULAR:  Negative for chest pain, palpitations, tachycardia, orthopnea, and edema.      RESPIRATORY:  Negative for cough, dyspnea, and hemoptysis.      MUSCULOSKELETAL:  Positive for arthralgias and myalgias.   Negative for back pain, joint stiffness or limb pain.      NEUROLOGICAL:  Negative for dizziness, headaches, paresthesias, and weakness.      PSYCHIATRIC:  Positive for anxiety,  crying spells, depression, feelings of stress, anhedonia, difficulty concentrating, sadness and hypersomnia.   Negative for mood swings, personality change, recreational drug use or suicidal thoughts.          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 Qiu denies any current form of exercise.          Tobacco/Alcohol/Supplements:     Last Reviewed on 10/28/2019 04:15 PM by Alice Mistry    Tobacco: Current Smoker: She currently smokes every day, 1-5 cigarettes per day.          Substance Abuse History:     Last Reviewed on 10/28/2019 04:15 PM by Alice Mistry        Mental Health History:     Last Reviewed on 10/28/2019 04:15 PM by Alice Mistry        Communicable Diseases (eg STDs):     Last Reviewed on 10/28/2019 04:15 PM by Alice Mistry        Current Problems:     Last Reviewed on 1/17/2020 01:53 PM by Spurling, Sarah C    Hypothyroidism, unspecified    Acute thyroiditis    Vitamin D deficiency, unspecified    Dysthymic disorder    Acne vulgaris    Low back pain    Gastro-esophageal reflux disease without esophagitis    Encounter for screening for lipoid disorders    Vitamin B12 deficiency anemia due to intrinsic factor deficiency    Encounter for screening for other disorder    Overweight        Immunizations:     None        Allergies:     Last Reviewed on 1/17/2020 01:53 PM by Spurling, Sarah C    No Known Allergies.        Current Medications:     Last Reviewed on 1/17/2020 01:58 PM by Spurling, Sarah C    Bupropion HCl 150 mg  "oral Tablet, Extended Release 24 hr [1 tab daily]    Spironolactone 50 mg oral tablet [Take 1 tablet(s) by mouth daily]    escitalopram oxalate 20 mg oral tablet [TAKE ONE TABLET BY MOUTH DAILY]    NP Thyroid 90mg Tablet [Take 1 tablet(s) by mouth daily]    Xanax 0.5 mg oral tablet [1 po BID prn]        Objective:        Vitals:         Current: 1/17/2020 2:00:13 PM    Ht:  5 ft, 7 in;  Wt: 196.2 lbs;  BMI: 30.7T: 98.4 F (oral);  BP: 121/50 mm Hg (left arm, sitting);  P: 65 bpm (left arm (BP Cuff), sitting);  sCr: 0.99 mg/dL;  GFR: 85.58        Exams:     PHYSICAL EXAM:     GENERAL: no apparent distress;     RESPIRATORY: normal respiratory rate and pattern with no distress; normal breath sounds with no rales, rhonchi, wheezes or rubs;     CARDIOVASCULAR: normal rate; rhythm is regular;     MUSCULOSKELETAL:  Normal range of motion, strength and tone;     NEUROLOGIC: mental status: alert and oriented x 3; GROSSLY INTACT     PSYCHIATRIC: affect/demeanor: flat;  normal psychomotor function; normal speech pattern; normal thought and perception;         Lab/Test Results:         All urine drug screen levels confirmed negative: yes (01/17/2020),     Date and time of last pill: xanax \"couple weeks ago\" /ael (01/17/2020),     Performed by: and (01/17/2020),     Collection Time: 245 (01/17/2020),             Assessment:         Z13.31   Encounter for screening for depression       E03.9   Hypothyroidism, unspecified       F34.1   Dysthymic disorder       L70.0   Acne vulgaris       Z79.899   Other long term (current) drug therapy       Z13.1   Encounter for screening for diabetes mellitus       E55.9   Vitamin D deficiency, unspecified       M25.50   Pain in unspecified joint           ORDERS:         Meds Prescribed:       [Refilled] Bupropion HCl 150 mg oral Tablet, Extended Release 24 hr [1 tab daily], #90 (ninety) tablets, Refills: 1 (one)       [Refilled] escitalopram oxalate 20 mg oral tablet [TAKE ONE TABLET BY MOUTH " "DAILY], #90 (ninety) tablets, Refills: 1 (one)       [Refilled] spironolactone 50 mg oral tablet [Take 1 tablet(s) by mouth daily], #90 (ninety) tablets, Refills: 1 (one)         Lab Orders:       56518  Drug test prsmv read direct optical obs pr date  (In-House)            65569  TSH - [facility] TSH  (Send-Out)            40981  VITD - [facility] Vitamin D, 25 Hydroxy  (Send-Out)            70742  COMP - [facility] Comp. Metabolic Panel  (Send-Out)            57958  RAPII - [facility] Arthritis Profile  (Send-Out)              Other Orders:         Depression screen positive and follow up plan documented  (In-House)                      Plan:         Encounter for screening for depression    MIPS PHQ-9 Depression Screening: Completed form scanned and in chart; Total Score 20 Positive Depression Screen: Stable on medications. No suicidal ideation.            Orders:         Depression screen positive and follow up plan documented  (In-House)              Hypothyroidism, unspecified    LABORATORY:  Labs ordered to be performed today include TSH.            Orders:       94163  TSH - [facility] TSH  (Send-Out)              Dysthymic disorder        continue counseling.  advise that she consider genesight testing or seeing mental health specialist.  she does not need xanax refill today.  I prefer not to precribe xanax due to addiction potential.  long term controlled substance use would require that she see a physician here or mental health specialist.  also explain [place] bill 1 law requiring drug screen and controlled consent form be completed.  she states \"Dr Hoffmann doesn't have to do that.\"  again NPs cannot prescribe long term controlled substances.            Prescriptions:       [Refilled] Bupropion HCl 150 mg oral Tablet, Extended Release 24 hr [1 tab daily], #90 (ninety) tablets, Refills: 1 (one)       [Refilled] escitalopram oxalate 20 mg oral tablet [TAKE ONE TABLET BY MOUTH DAILY], #90 (ninety) tablets, Refills: 1 (one)         " Acne vulgarisagreeable to CMP level -  diuretics could contribute to electrolyte imbalance.          Prescriptions:       [Refilled] spironolactone 50 mg oral tablet [Take 1 tablet(s) by mouth daily], #90 (ninety) tablets, Refills: 1 (one)         Other long term (current) drug therapycontrolled consent and rose          Orders:       30861  Drug test prsmv read direct optical obs pr date  (In-House)              Encounter for screening for diabetes mellitus    LABORATORY:  Labs ordered to be performed today include Comprehensive metabolic panel.            Orders:       67898  COMP - [facility] Comp. Metabolic Panel  (Send-Out)              Vitamin D deficiency, unspecified    LABORATORY:  Labs ordered to be performed today include Vitamin D.            Orders:       54436  VITD - [facility] Vitamin D, 25 Hydroxy  (Send-Out)              Pain in unspecified joint    LABORATORY:  Labs ordered to be performed today include Arthritis Profile.            Orders:       13951  RAPII - [facility] Arthritis Profile  (Send-Out)                  Patient Recommendations:        For  Pain in unspecified joint:    I also recommend ^.              Charge Capture:         Primary Diagnosis:     Z13.31  Encounter for screening for depression           Orders:      98487  Office/outpatient visit; established patient, level 4  (In-House)              Depression screen positive and follow up plan documented  (In-House)              E03.9  Hypothyroidism, unspecified     F34.1  Dysthymic disorder     L70.0  Acne vulgaris     Z79.899  Other long term (current) drug therapy           Orders:      23474  Drug test prsmv read direct optical obs pr date  (In-House)              Z13.1  Encounter for screening for diabetes mellitus     E55.9  Vitamin D deficiency, unspecified     M25.50  Pain in unspecified joint

## 2021-07-01 VITALS
BODY MASS INDEX: 30.04 KG/M2 | SYSTOLIC BLOOD PRESSURE: 112 MMHG | HEART RATE: 78 BPM | WEIGHT: 191.4 LBS | DIASTOLIC BLOOD PRESSURE: 71 MMHG | TEMPERATURE: 98.7 F | HEIGHT: 67 IN

## 2021-07-01 VITALS
WEIGHT: 190.8 LBS | SYSTOLIC BLOOD PRESSURE: 119 MMHG | HEART RATE: 74 BPM | TEMPERATURE: 98.3 F | BODY MASS INDEX: 29.95 KG/M2 | DIASTOLIC BLOOD PRESSURE: 61 MMHG | HEIGHT: 67 IN

## 2021-07-01 VITALS
TEMPERATURE: 97.9 F | DIASTOLIC BLOOD PRESSURE: 72 MMHG | SYSTOLIC BLOOD PRESSURE: 125 MMHG | HEIGHT: 67 IN | WEIGHT: 194.2 LBS | BODY MASS INDEX: 30.48 KG/M2 | HEART RATE: 79 BPM

## 2021-07-01 VITALS
HEIGHT: 67 IN | TEMPERATURE: 98 F | BODY MASS INDEX: 30.06 KG/M2 | HEART RATE: 66 BPM | WEIGHT: 191.5 LBS | SYSTOLIC BLOOD PRESSURE: 112 MMHG | DIASTOLIC BLOOD PRESSURE: 70 MMHG

## 2021-07-02 VITALS
TEMPERATURE: 97.1 F | HEART RATE: 66 BPM | SYSTOLIC BLOOD PRESSURE: 104 MMHG | WEIGHT: 201.6 LBS | DIASTOLIC BLOOD PRESSURE: 67 MMHG | BODY MASS INDEX: 31.64 KG/M2 | HEIGHT: 67 IN

## 2021-07-02 VITALS
DIASTOLIC BLOOD PRESSURE: 50 MMHG | TEMPERATURE: 98.4 F | BODY MASS INDEX: 30.79 KG/M2 | HEART RATE: 65 BPM | HEIGHT: 67 IN | SYSTOLIC BLOOD PRESSURE: 121 MMHG | WEIGHT: 196.2 LBS

## 2021-07-02 VITALS
BODY MASS INDEX: 31.36 KG/M2 | SYSTOLIC BLOOD PRESSURE: 124 MMHG | WEIGHT: 199.8 LBS | DIASTOLIC BLOOD PRESSURE: 69 MMHG | TEMPERATURE: 97.5 F | HEART RATE: 94 BPM | HEIGHT: 67 IN

## 2021-07-06 RX ORDER — SPIRONOLACTONE 50 MG/1
TABLET, FILM COATED ORAL
Qty: 90 TABLET | Refills: 1 | Status: SHIPPED | OUTPATIENT
Start: 2021-07-06 | End: 2021-07-15

## 2021-07-12 RX ORDER — SPIRONOLACTONE 50 MG/1
TABLET, FILM COATED ORAL
Qty: 90 TABLET | Refills: 0 | OUTPATIENT
Start: 2021-07-12

## 2021-07-12 RX ORDER — LEVOTHYROXINE SODIUM 0.15 MG/1
150 TABLET ORAL DAILY
COMMUNITY

## 2021-07-12 RX ORDER — ERGOCALCIFEROL (VITAMIN D2) 200 MCG/ML
DROPS ORAL DAILY
COMMUNITY
End: 2022-01-28

## 2021-07-12 RX ORDER — BUPROPION HYDROCHLORIDE 150 MG/1
1 TABLET ORAL DAILY
COMMUNITY
Start: 2021-06-27 | End: 2021-12-14

## 2021-07-12 RX ORDER — ESCITALOPRAM OXALATE 20 MG/1
1 TABLET ORAL DAILY
COMMUNITY
Start: 2021-06-27 | End: 2021-10-29

## 2021-07-12 RX ORDER — PANTOPRAZOLE SODIUM 40 MG/1
1 TABLET, DELAYED RELEASE ORAL DAILY
COMMUNITY
Start: 2021-06-08 | End: 2022-09-02 | Stop reason: SDUPTHER

## 2021-07-12 RX ORDER — SUCRALFATE 1 G/1
1 TABLET ORAL 2 TIMES DAILY PRN
COMMUNITY
Start: 2021-06-08 | End: 2022-09-02 | Stop reason: SDUPTHER

## 2021-07-13 ENCOUNTER — TELEPHONE (OUTPATIENT)
Dept: FAMILY MEDICINE CLINIC | Age: 41
End: 2021-07-13

## 2021-07-13 NOTE — TELEPHONE ENCOUNTER
Caller: Alysa Qiu CARINA    Relationship: Self    Best call back number: [phone]    Medication needed:   Requested Prescriptions      No prescriptions requested or ordered in this encounter       When do you need the refill by: ASAP    What additional details did the patient provide when requesting the medication: PATIENT IS STILL NEEDING HER spironolactone (ALDACTONE) 50 MG tablet SHE IS TOTALLY OUT. PHARMACY TOLD HER THEY REQUESTED IT BUT IT WAS DECLINED.    Does the patient have less than a 3 day supply:  [x] Yes  [] No    What is the patient's preferred pharmacy: [facility] [address] [place] [address], [place] - [facility] [address]  - [phone] [facility] [phone] FX

## 2021-07-15 RX ORDER — SPIRONOLACTONE 50 MG/1
TABLET, FILM COATED ORAL
Qty: 90 TABLET | Refills: 0 | Status: SHIPPED | OUTPATIENT
Start: 2021-07-15 | End: 2021-07-15 | Stop reason: SDUPTHER

## 2021-07-15 RX ORDER — SPIRONOLACTONE 50 MG/1
50 TABLET, FILM COATED ORAL DAILY
Qty: 30 TABLET | Refills: 0 | Status: SHIPPED | OUTPATIENT
Start: 2021-07-15 | End: 2021-12-02

## 2021-08-18 ENCOUNTER — TRANSCRIBE ORDERS (OUTPATIENT)
Dept: ADMINISTRATIVE | Facility: HOSPITAL | Age: 41
End: 2021-08-18

## 2021-08-18 ENCOUNTER — LAB (OUTPATIENT)
Dept: LAB | Facility: HOSPITAL | Age: 41
End: 2021-08-18

## 2021-08-18 DIAGNOSIS — R53.83 TIREDNESS: ICD-10-CM

## 2021-08-18 DIAGNOSIS — R53.83 TIREDNESS: Primary | ICD-10-CM

## 2021-08-18 LAB
ALBUMIN SERPL-MCNC: 3.9 G/DL (ref 3.5–5.2)
ALBUMIN/GLOB SERPL: 1.4 G/DL
ALP SERPL-CCNC: 63 U/L (ref 39–117)
ALT SERPL W P-5'-P-CCNC: 22 U/L (ref 1–33)
ANION GAP SERPL CALCULATED.3IONS-SCNC: 7.9 MMOL/L (ref 5–15)
AST SERPL-CCNC: 17 U/L (ref 1–32)
BASOPHILS # BLD AUTO: 0.06 10*3/MM3 (ref 0–0.2)
BASOPHILS NFR BLD AUTO: 0.9 % (ref 0–1.5)
BILIRUB SERPL-MCNC: 0.3 MG/DL (ref 0–1.2)
BUN SERPL-MCNC: 6 MG/DL (ref 6–20)
BUN/CREAT SERPL: 6.7 (ref 7–25)
CALCIUM SPEC-SCNC: 10.3 MG/DL (ref 8.6–10.5)
CHLORIDE SERPL-SCNC: 105 MMOL/L (ref 98–107)
CHOLEST SERPL-MCNC: 211 MG/DL (ref 0–200)
CO2 SERPL-SCNC: 27.1 MMOL/L (ref 22–29)
CREAT SERPL-MCNC: 0.9 MG/DL (ref 0.57–1)
DEPRECATED RDW RBC AUTO: 42.1 FL (ref 37–54)
EOSINOPHIL # BLD AUTO: 0.27 10*3/MM3 (ref 0–0.4)
EOSINOPHIL NFR BLD AUTO: 4.2 % (ref 0.3–6.2)
ERYTHROCYTE [DISTWIDTH] IN BLOOD BY AUTOMATED COUNT: 14.6 % (ref 12.3–15.4)
GFR SERPL CREATININE-BSD FRML MDRD: 68 ML/MIN/1.73
GFR SERPL CREATININE-BSD FRML MDRD: 83 ML/MIN/1.73
GLOBULIN UR ELPH-MCNC: 2.8 GM/DL
GLUCOSE SERPL-MCNC: 113 MG/DL (ref 65–99)
HCT VFR BLD AUTO: 35.5 % (ref 34–46.6)
HDLC SERPL-MCNC: 31 MG/DL (ref 40–60)
HGB BLD-MCNC: 11.5 G/DL (ref 12–15.9)
IMM GRANULOCYTES # BLD AUTO: 0.02 10*3/MM3 (ref 0–0.05)
IMM GRANULOCYTES NFR BLD AUTO: 0.3 % (ref 0–0.5)
LDLC SERPL CALC-MCNC: 150 MG/DL (ref 0–100)
LDLC/HDLC SERPL: 4.76 {RATIO}
LYMPHOCYTES # BLD AUTO: 2.11 10*3/MM3 (ref 0.7–3.1)
LYMPHOCYTES NFR BLD AUTO: 33 % (ref 19.6–45.3)
MCH RBC QN AUTO: 25.5 PG (ref 26.6–33)
MCHC RBC AUTO-ENTMCNC: 32.4 G/DL (ref 31.5–35.7)
MCV RBC AUTO: 78.7 FL (ref 79–97)
MONOCYTES # BLD AUTO: 0.62 10*3/MM3 (ref 0.1–0.9)
MONOCYTES NFR BLD AUTO: 9.7 % (ref 5–12)
NEUTROPHILS NFR BLD AUTO: 3.31 10*3/MM3 (ref 1.7–7)
NEUTROPHILS NFR BLD AUTO: 51.9 % (ref 42.7–76)
PLATELET # BLD AUTO: 393 10*3/MM3 (ref 140–450)
PMV BLD AUTO: 10.3 FL (ref 6–12)
POTASSIUM SERPL-SCNC: 4 MMOL/L (ref 3.5–5.2)
PROT SERPL-MCNC: 6.7 G/DL (ref 6–8.5)
RBC # BLD AUTO: 4.51 10*6/MM3 (ref 3.77–5.28)
SODIUM SERPL-SCNC: 140 MMOL/L (ref 136–145)
T4 FREE SERPL-MCNC: 1.57 NG/DL (ref 0.93–1.7)
TRIGL SERPL-MCNC: 162 MG/DL (ref 0–150)
TSH SERPL DL<=0.05 MIU/L-ACNC: 0.01 UIU/ML (ref 0.27–4.2)
VLDLC SERPL-MCNC: 30 MG/DL (ref 5–40)
WBC # BLD AUTO: 6.39 10*3/MM3 (ref 3.4–10.8)

## 2021-08-18 PROCEDURE — 36415 COLL VENOUS BLD VENIPUNCTURE: CPT

## 2021-08-18 PROCEDURE — 84443 ASSAY THYROID STIM HORMONE: CPT

## 2021-08-18 PROCEDURE — 82306 VITAMIN D 25 HYDROXY: CPT

## 2021-08-18 PROCEDURE — 84425 ASSAY OF VITAMIN B-1: CPT

## 2021-08-18 PROCEDURE — 81374 HLA I TYPING 1 ANTIGEN LR: CPT

## 2021-08-18 PROCEDURE — 80061 LIPID PANEL: CPT

## 2021-08-18 PROCEDURE — 84439 ASSAY OF FREE THYROXINE: CPT

## 2021-08-18 PROCEDURE — 80053 COMPREHEN METABOLIC PANEL: CPT

## 2021-08-18 PROCEDURE — 85025 COMPLETE CBC W/AUTO DIFF WBC: CPT

## 2021-08-20 LAB — VIT B1 BLD-SCNC: 90.1 NMOL/L (ref 66.5–200)

## 2021-08-24 LAB — HLA-B27 QL NAA+PROBE: POSITIVE

## 2021-08-26 LAB
25(OH)D2 SERPL-MCNC: <1 NG/ML
25(OH)D3 SERPL-MCNC: 29 NG/ML
25(OH)D3+25(OH)D2 SERPL-MCNC: 30 NG/ML

## 2021-09-27 ENCOUNTER — TELEPHONE (OUTPATIENT)
Dept: FAMILY MEDICINE CLINIC | Age: 41
End: 2021-09-27

## 2021-09-27 NOTE — TELEPHONE ENCOUNTER
Spoke to Dr Wilburn and he wants her to start the Lexapro back today and wait a few days before starting the 150mg of the Wellbutrin and see if Dr Melvin wants to do anything different.  Pt informed.

## 2021-09-27 NOTE — TELEPHONE ENCOUNTER
Pt states today she realized that she had been taking her medication wrong.  She called for a refill on her Lexapro on 9/13/21 and picked it up, but the pharm had filled an old rx for Wellburtin 300mg instead, but pt did not notice so she has been taking her normal dose of the Wellburtin 150mg plus the wellbuitrin 300mg thinking it was the lexapro.  So she has been taking a total of 450mg daily of the wellbutrin since 9/13/21.  She is very frantic and not feeling right.  Is there something she should do or just the the meds get out of her system?  Sent to Dr Wilburn on call.

## 2021-09-28 NOTE — TELEPHONE ENCOUNTER
Let's see if she wants to get in for an appt with me.  If she is feeling better about things and doesn't need to come in, that's also fine.  Thanks, SAROJ

## 2021-09-28 NOTE — TELEPHONE ENCOUNTER
Spoke to pt, states she is better, but not great.  She does not think she needs an appt at this time since she is getting better.

## 2021-10-29 RX ORDER — ESCITALOPRAM OXALATE 20 MG/1
TABLET ORAL
Qty: 90 TABLET | Refills: 1 | Status: SHIPPED | OUTPATIENT
Start: 2021-10-29 | End: 2022-06-03 | Stop reason: SDUPTHER

## 2021-12-01 ENCOUNTER — TELEPHONE (OUTPATIENT)
Dept: FAMILY MEDICINE CLINIC | Age: 41
End: 2021-12-01

## 2021-12-01 ENCOUNTER — OFFICE VISIT (OUTPATIENT)
Dept: FAMILY MEDICINE CLINIC | Age: 41
End: 2021-12-01

## 2021-12-01 VITALS
TEMPERATURE: 101 F | BODY MASS INDEX: 32.26 KG/M2 | OXYGEN SATURATION: 97 % | HEART RATE: 93 BPM | SYSTOLIC BLOOD PRESSURE: 120 MMHG | WEIGHT: 206 LBS | DIASTOLIC BLOOD PRESSURE: 71 MMHG

## 2021-12-01 DIAGNOSIS — J10.1 INFLUENZA A: Primary | ICD-10-CM

## 2021-12-01 DIAGNOSIS — R05.9 COUGH: ICD-10-CM

## 2021-12-01 DIAGNOSIS — R50.9 FEBRILE ILLNESS, ACUTE: ICD-10-CM

## 2021-12-01 LAB
EXPIRATION DATE: ABNORMAL
EXPIRATION DATE: NORMAL
FLUAV AG NPH QL: POSITIVE
FLUBV AG NPH QL: NEGATIVE
INTERNAL CONTROL: ABNORMAL
INTERNAL CONTROL: NORMAL
Lab: ABNORMAL
Lab: NORMAL
SARS-COV-2 AG UPPER RESP QL IA.RAPID: NOT DETECTED

## 2021-12-01 PROCEDURE — 99213 OFFICE O/P EST LOW 20 MIN: CPT | Performed by: FAMILY MEDICINE

## 2021-12-01 PROCEDURE — 87804 INFLUENZA ASSAY W/OPTIC: CPT | Performed by: FAMILY MEDICINE

## 2021-12-01 PROCEDURE — 87426 SARSCOV CORONAVIRUS AG IA: CPT | Performed by: FAMILY MEDICINE

## 2021-12-01 RX ORDER — DEXTROMETHORPHAN HYDROBROMIDE AND PROMETHAZINE HYDROCHLORIDE 15; 6.25 MG/5ML; MG/5ML
5 SYRUP ORAL 4 TIMES DAILY PRN
Qty: 400 ML | Refills: 0 | Status: SHIPPED | OUTPATIENT
Start: 2021-12-01 | End: 2022-06-03

## 2021-12-01 RX ORDER — ALBUTEROL SULFATE 90 UG/1
2 AEROSOL, METERED RESPIRATORY (INHALATION) EVERY 4 HOURS PRN
Qty: 18 G | Refills: 1 | Status: SHIPPED | OUTPATIENT
Start: 2021-12-01

## 2021-12-01 RX ORDER — OSELTAMIVIR PHOSPHATE 75 MG/1
75 CAPSULE ORAL 2 TIMES DAILY
Qty: 10 CAPSULE | Refills: 0 | Status: SHIPPED | OUTPATIENT
Start: 2021-12-01 | End: 2021-12-29

## 2021-12-01 NOTE — PROGRESS NOTES
Chief Complaint  Cough (body aches, fever, congestion. Patient states she was exposed to someone with same sx they were negative covid and flu, pt was tested yesterday for covid and was negative, sx started 3 days ago.)    Subjective          Alysa Qiu presents to [facility]  History of Present Illness 1 more time 41-year-old female coming today for sick visit.  Over the last 3 days she has had a cough, fevers , body ache and nasal congestion.  Patient was tested yesterday for Covid this was negative but the patient tested herself and is not sure that she perform the test correctly.    \  Social history is significant for patient smokes close to a pack a day on weekends maybe 4 cigarettes during the weekdays when she is at work.  She has smoked for at least 25 years.    Review of Systems   Constitutional: Positive for activity change, fatigue and fever. Negative for appetite change, chills and diaphoresis.        Patient has had some night sweats since she has been ill over the last 3 days.  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   HENT: Positive for congestion, postnasal drip, rhinorrhea and sinus pressure. Negative for ear discharge, ear pain, nosebleeds, sneezing and sore throat.    Eyes: Negative.    Respiratory: Positive for cough and chest tightness. Negative for shortness of breath.    Cardiovascular: Negative.    Gastrointestinal: Negative.    Musculoskeletal: Positive for myalgias. Negative for arthralgias, back pain, gait problem, joint swelling, neck pain and neck stiffness.        No Known Allergies    Current Outpatient Medications:   •  buPROPion XL (WELLBUTRIN XL) 150 MG 24 hr tablet, Take 1 tablet by mouth Daily., Disp: , Rfl:   •  Ergocalciferol 200 MCG/ML drops, Take  by mouth Daily., Disp: , Rfl:   •  escitalopram (LEXAPRO) 20 MG tablet, TAKE ONE TABLET BY MOUTH DAILY, Disp: 90 tablet, Rfl: 1  •  levothyroxine (SYNTHROID,  LEVOTHROID) 150 MCG tablet, Take 150 mcg by mouth Daily., Disp: , Rfl:   •  oseltamivir (Tamiflu) 75 MG capsule, Take 1 capsule by mouth 2 (Two) Times a Day., Disp: 10 capsule, Rfl: 0  •  pantoprazole (PROTONIX) 40 MG EC tablet, Take 1 tablet by mouth Daily., Disp: , Rfl:   •  promethazine-dextromethorphan (PROMETHAZINE-DM) 6.25-15 MG/5ML syrup, Take 5 mL by mouth 4 (Four) Times a Day As Needed for Cough., Disp: 400 mL, Rfl: 0  •  spironolactone (ALDACTONE) 50 MG tablet, Take 1 tablet by mouth Daily., Disp: 30 tablet, Rfl: 0  •  sucralfate (CARAFATE) 1 g tablet, Take 1 tablet by mouth 2 (two) times a day. On an empty stomach, Disp: , Rfl:     Objective   Vital Signs:   /71 (BP Location: Left arm, Patient Position: Sitting)   Pulse 93   Temp (!) 101 °F (38.3 °C) (Oral)   Wt 93.4 kg (206 lb)   SpO2 97%   BMI 32.26 kg/m²     Physical Exam  Constitutional:       General: She is not in acute distress.     Appearance: She is ill-appearing. She is not toxic-appearing or diaphoretic.   HENT:      Head: Normocephalic and atraumatic.      Nose: Congestion and rhinorrhea present.      Comments: Facial tenderness in all quadrants     Mouth/Throat:      Mouth: Mucous membranes are dry.      Pharynx: No oropharyngeal exudate or posterior oropharyngeal erythema.   Eyes:      Extraocular Movements: Extraocular movements intact.      Pupils: Pupils are equal, round, and reactive to light.   Cardiovascular:      Rate and Rhythm: Normal rate and regular rhythm.   Pulmonary:      Effort: Pulmonary effort is normal.      Breath sounds: Normal breath sounds.   Abdominal:      General: Bowel sounds are normal.      Palpations: Abdomen is soft.   Musculoskeletal:      Cervical back: Normal range of motion and neck supple.   Skin:     General: Skin is warm and dry.      Capillary Refill: Capillary refill takes less than 2 seconds.   Neurological:      Mental Status: She is alert.        Result Review :   Lab Results   Component  Value Date    RAPFLUA Positive (A) 12/01/2021    RAPFLUB Negative 12/01/2021         POCT SARS-CoV-2 Antigen STAN  Order: [id number]   Status: Final result     Visible to patient: No (scheduled for 12/1/2021 11:26 PM)     Next appt: 12/03/2021 at 08:30 AM in Family Medicine (Ann Melvin MD)     Dx: Cough    Specimen Information: Swab         0 Result Notes    Component   Ref Range & Units 09:25 09:25   SARS Antigen   Not Detected Not Detected     Internal Control   Passed Passed  Passed    Lot Number  [id number]  [id number]    Expiration Date  3/6/23  4/3/22    Resulting Agency  [facility]              Specimen Collected: 12/01/21 09:25 Last Resulted: 12/01/21 09:25                      Assessment and Plan    Diagnoses and all orders for this visit:    1. Cough (Primary)  -     POCT Influenza A/B        -     POCT COVID      For some reason, I cannot import over her diagnosis codes with now include influenza A and febrile illness.  Patient will start Tamiflu and also cough syrup which I sent to her pharmacy.  She was given a note for off work until December 6, 2021.  Patient verbalized my instructions for her to stay home rest, drink plenty of clear warm liquids, she can use Aleve or Tylenol or ibuprofen for any discomfort along with the cough syrup and the Tamiflu.  Patient was agreeable to this plan.    Follow Up   No follow-ups on file.  Patient was given instructions and counseling regarding her condition or for health maintenance advice. Please see specific information pulled into the AVS if appropriate.

## 2021-12-01 NOTE — TELEPHONE ENCOUNTER
Please send in albuterol/Ventolin inhaler, 2 puffs up to 4 times a day as needed for wheezing and cough. Thank you this would be an 18 g with 1 refill.

## 2021-12-01 NOTE — TELEPHONE ENCOUNTER
Caller: Alysa Qiu CARINA    Relationship: Self    Best call back number: [phone]    What medication are you requesting: INHALER    What are your current symptoms: CHEST CONGESTION    How long have you been experiencing symptoms: SEVERAL DAYS    Have you had these symptoms before:    [x] Yes  [] No    Have you been treated for these symptoms before:   [] Yes  [x] No    If a prescription is needed, what is your preferred pharmacy and phone number:      [facility] - [place], [place] - [address]  - [phone]  - [phone] FX    Additional notes: PATIENT WOULD LIKE TO KNOW IF SHE CAN BE PRESCRIBED AN INHALER FOR HER CHEST CONGESTION.

## 2021-12-02 RX ORDER — SPIRONOLACTONE 50 MG/1
TABLET, FILM COATED ORAL
Qty: 90 TABLET | Refills: 0 | Status: SHIPPED | OUTPATIENT
Start: 2021-12-02 | End: 2022-06-03

## 2021-12-14 RX ORDER — BUPROPION HYDROCHLORIDE 150 MG/1
TABLET ORAL
Qty: 90 TABLET | Refills: 1 | Status: SHIPPED | OUTPATIENT
Start: 2021-12-14 | End: 2022-07-27

## 2021-12-29 ENCOUNTER — OFFICE VISIT (OUTPATIENT)
Dept: FAMILY MEDICINE CLINIC | Age: 41
End: 2021-12-29

## 2021-12-29 ENCOUNTER — TELEPHONE (OUTPATIENT)
Dept: FAMILY MEDICINE CLINIC | Age: 41
End: 2021-12-29

## 2021-12-29 VITALS
WEIGHT: 200 LBS | HEIGHT: 67 IN | BODY MASS INDEX: 31.39 KG/M2 | TEMPERATURE: 98.3 F | HEART RATE: 91 BPM | OXYGEN SATURATION: 97 % | DIASTOLIC BLOOD PRESSURE: 78 MMHG | SYSTOLIC BLOOD PRESSURE: 114 MMHG

## 2021-12-29 DIAGNOSIS — R05.9 COUGH: ICD-10-CM

## 2021-12-29 DIAGNOSIS — U07.1 COVID-19 VIRUS INFECTION: Primary | ICD-10-CM

## 2021-12-29 LAB
EXPIRATION DATE: ABNORMAL
FLUAV AG UPPER RESP QL IA.RAPID: NOT DETECTED
FLUBV AG UPPER RESP QL IA.RAPID: NOT DETECTED
INTERNAL CONTROL: ABNORMAL
Lab: ABNORMAL
SARS-COV-2 AG UPPER RESP QL IA.RAPID: DETECTED

## 2021-12-29 PROCEDURE — 99213 OFFICE O/P EST LOW 20 MIN: CPT | Performed by: FAMILY MEDICINE

## 2021-12-29 PROCEDURE — 87428 SARSCOV & INF VIR A&B AG IA: CPT | Performed by: FAMILY MEDICINE

## 2021-12-29 NOTE — PROGRESS NOTES
"Chief Complaint  Cough (x 2 days) and Fatigue (extreme x 2 days, overall since 11/24)    Subjective          Alysa Qiu presents to [facility] today for acute complaint of fatigue and cough for the past 2 days.  She reports positive fatigue and malaise, positive (subjective) fevers, positive chills, positive headaches, positive rhinorrhea, positive congestion, negative sore throat, positive changes in taste or smell (gone starting last night).  She reports positive cough, productive of green sputum, negative chest pain, positive shortness of breath.  She has negative abdominal pain, negative nausea, negative vomiting, negative diarrhea, negative body aches.  Sick contacts:  Friend tested positive yesterday (last contact was 5 days prior).  Dxed with flu A back at [facility] (had been recovering from that prior to 2 days ago).  She is not vaccinated against COVID.  She smoked previously but stopped 11/2021.      Objective   Vital Signs:   /78   Pulse 91   Temp 98.3 °F (36.8 °C) (Oral)   Ht 170.2 cm (67.01\")   Wt 90.7 kg (200 lb)   SpO2 97%   BMI 31.32 kg/m²     Physical Exam  Vitals reviewed.   Constitutional:       General: She is not in acute distress.     Appearance: Normal appearance.   HENT:      Right Ear: Tympanic membrane, ear canal and external ear normal.      Left Ear: Tympanic membrane, ear canal and external ear normal.      Nose: Congestion and rhinorrhea present.      Right Turbinates: Swollen and pale.      Left Turbinates: Swollen and pale.      Mouth/Throat:      Mouth: Mucous membranes are moist.      Pharynx: Posterior oropharyngeal erythema present.      Tonsils: No tonsillar exudate.   Eyes:      Extraocular Movements: Extraocular movements intact.      Pupils: Pupils are equal, round, and reactive to light.   Cardiovascular:      Rate and Rhythm: Normal rate and regular rhythm.      Heart sounds: No murmur heard.      Pulmonary:      Effort: " Pulmonary effort is normal. No respiratory distress.      Breath sounds: Normal breath sounds. No wheezing, rhonchi or rales.   Abdominal:      General: Bowel sounds are normal.      Tenderness: There is no abdominal tenderness.   Musculoskeletal:         General: Normal range of motion.   Neurological:      Mental Status: She is alert.             Assessment and Plan    Diagnoses and all orders for this visit:    1. COVID-19 virus infection (Primary)  Assessment & Plan:  Alysa presents with sx consistent with COVID for the past 2 days.  She has had known exposure to COVID.  Rapid POCT for COVID came back positive today.  We will not send PCR testing for confirmation of diagnosis.  She does qualify for monoclonal antibody therapy based on her BMI, but declines this today after discussion of the risks and benefits.  It would not be easy to get her in for therapy in any case due to poor availability.  Symptomatic care recommended with OTC analgesics for pain/fever, OTC decongestants and cough suppressants prn.  Stay well hydrated.  Humidifier in the bedroom at night.  Hot tea with lemon and honey.  She should quarantine for a total of 10 days since symptoms began have passed AND her other symptoms have been improving for 3 days.   Work note given today.  She should contact us or return to clinic if her sx worsen, especially if she develops chest pain, shortness of breath, fevers that do not respond to medications, uncontrollable vomiting, or significant swelling and/or pain in one leg.           2. Cough  -     POCT SARS-CoV-2 Antigen STAN + Flu       Follow Up   Return for WWE (30 min) first available.  Patient was given instructions and counseling regarding her condition or for health maintenance advice. Please see specific information pulled into the AVS if appropriate.

## 2021-12-29 NOTE — ASSESSMENT & PLAN NOTE
Alysa presents with sx consistent with COVID for the past 2 days.  She has had known exposure to COVID.  Rapid POCT for COVID came back positive today.  We will not send PCR testing for confirmation of diagnosis.  She does qualify for monoclonal antibody therapy based on her BMI, but declines this today after discussion of the risks and benefits.  It would not be easy to get her in for therapy in any case due to poor availability.  Symptomatic care recommended with OTC analgesics for pain/fever, OTC decongestants and cough suppressants prn.  Stay well hydrated.  Humidifier in the bedroom at night.  Hot tea with lemon and honey.  She should quarantine for a total of 10 days since symptoms began have passed AND her other symptoms have been improving for 3 days.   Work note given today.  She should contact us or return to clinic if her sx worsen, especially if she develops chest pain, shortness of breath, fevers that do not respond to medications, uncontrollable vomiting, or significant swelling and/or pain in one leg.

## 2021-12-29 NOTE — TELEPHONE ENCOUNTER
Pt states she talked to one of her nurse friends and they told her to get the Regeneron infusion, they did have this at one time at [facility].  This is the only infusion she wants.  Also is requesting an rx for trilogy inhaler or dethamexadone.  Please advise.

## 2021-12-29 NOTE — TELEPHONE ENCOUNTER
Dexamethasone use in the out-pt setting for COVID leads to worse outcomes.  I will send in a rx for Trelegy inhaler, but her insurance may not approve, just to let her know, because it has only been used in studies for COVID and has not been approved for this use.  Still, it doesn't seem to cause harm, so I will send this in.  Can you please call down to [facility] to see if they have Regeneron infusion and if so, how we would go about getting her set up (if this is even possible)?  Thanks, SAROJ

## 2021-12-29 NOTE — TELEPHONE ENCOUNTER
Caller: Alysa Qiu    Relationship: Self    Best call back number: [phone]    What orders are you requesting (i.e. lab or imaging): PATIENT STATED REQUEST TO HAVE CLINICAL TEAM REACH OUT TO [facility] IN [place], [place] FOR REGENERON INFUSION TO BE DONE AS SOON AS POSSIBLE

## 2022-01-28 ENCOUNTER — OFFICE VISIT (OUTPATIENT)
Dept: FAMILY MEDICINE CLINIC | Age: 42
End: 2022-01-28

## 2022-01-28 VITALS
HEART RATE: 80 BPM | HEIGHT: 67 IN | SYSTOLIC BLOOD PRESSURE: 113 MMHG | BODY MASS INDEX: 31.99 KG/M2 | DIASTOLIC BLOOD PRESSURE: 60 MMHG | WEIGHT: 203.8 LBS | TEMPERATURE: 97.4 F

## 2022-01-28 DIAGNOSIS — Z11.59 ENCOUNTER FOR SCREENING FOR OTHER VIRAL DISEASES: ICD-10-CM

## 2022-01-28 DIAGNOSIS — Z20.2 POSSIBLE EXPOSURE TO STD: ICD-10-CM

## 2022-01-28 DIAGNOSIS — E55.9 VITAMIN D DEFICIENCY: ICD-10-CM

## 2022-01-28 DIAGNOSIS — F33.1 MAJOR DEPRESSIVE DISORDER, RECURRENT EPISODE, MODERATE DEGREE: ICD-10-CM

## 2022-01-28 DIAGNOSIS — E03.9 ACQUIRED HYPOTHYROIDISM: ICD-10-CM

## 2022-01-28 DIAGNOSIS — Z01.419 WELL WOMAN EXAM: Primary | ICD-10-CM

## 2022-01-28 DIAGNOSIS — E78.2 MIXED HYPERLIPIDEMIA: ICD-10-CM

## 2022-01-28 PROBLEM — F41.9 ANXIETY: Status: ACTIVE | Noted: 2022-01-28

## 2022-01-28 PROBLEM — R50.9 FEBRILE ILLNESS, ACUTE: Status: RESOLVED | Noted: 2021-12-01 | Resolved: 2022-01-28

## 2022-01-28 PROBLEM — R05.9 COUGH: Status: RESOLVED | Noted: 2021-12-01 | Resolved: 2022-01-28

## 2022-01-28 PROBLEM — K21.9 GASTROESOPHAGEAL REFLUX DISEASE WITHOUT ESOPHAGITIS: Status: ACTIVE | Noted: 2022-01-28

## 2022-01-28 PROBLEM — Z15.89 HLA B27 (HLA B27 POSITIVE): Status: ACTIVE | Noted: 2022-01-28

## 2022-01-28 PROBLEM — Z86.16 HISTORY OF COVID-19: Status: ACTIVE | Noted: 2021-12-29

## 2022-01-28 PROBLEM — J10.1 INFLUENZA A: Status: RESOLVED | Noted: 2021-12-01 | Resolved: 2022-01-28

## 2022-01-28 LAB
CANDIDA SPECIES: NEGATIVE
GARDNERELLA VAGINALIS: POSITIVE
T VAGINALIS DNA VAG QL PROBE+SIG AMP: NEGATIVE

## 2022-01-28 PROCEDURE — 99396 PREV VISIT EST AGE 40-64: CPT | Performed by: FAMILY MEDICINE

## 2022-01-28 PROCEDURE — G0123 SCREEN CERV/VAG THIN LAYER: HCPCS | Performed by: FAMILY MEDICINE

## 2022-01-28 PROCEDURE — 87480 CANDIDA DNA DIR PROBE: CPT | Performed by: FAMILY MEDICINE

## 2022-01-28 PROCEDURE — 87624 HPV HI-RISK TYP POOLED RSLT: CPT | Performed by: FAMILY MEDICINE

## 2022-01-28 PROCEDURE — 87660 TRICHOMONAS VAGIN DIR PROBE: CPT | Performed by: FAMILY MEDICINE

## 2022-01-28 PROCEDURE — 87510 GARDNER VAG DNA DIR PROBE: CPT | Performed by: FAMILY MEDICINE

## 2022-01-28 RX ORDER — METRONIDAZOLE 500 MG/1
500 TABLET ORAL 2 TIMES DAILY
Qty: 14 TABLET | Refills: 0 | Status: SHIPPED | OUTPATIENT
Start: 2022-01-28 | End: 2022-02-04

## 2022-01-28 NOTE — ASSESSMENT & PLAN NOTE
She is due for Pap smear; done today along with vag screen and GC/Chlam screen.  She has had abnormal pap smear in the past (3-5 years ago; she is not sure what).  She is due for breast exam; done today.  She is due for Covid, Td, flu.  Vaccines declined today.  She is due for routine blood work; ordered.

## 2022-01-28 NOTE — PROGRESS NOTES
Chief Complaint  Annual Exam (with pap), Labs Only (wants to discuss lab results from 8/18/21), and Vaccine (wants to discuss shingles vaccine)    Subjective          Alysa Qiu presents to [facility] today for her annual physical.  She is due for Pap smear.  She has had abnormal pap smear in the past (3-5 years ago; she is not sure what).  She is due for breast exam.  She is due for Covid, Td, flu.  She is due for routine blood work.    Labs back in August showed mixed hyperlipidemia with elevated triglycerides (mild) and LDL of 150.  Not currently on medication.    She is on levothyroxine for hypothyroidism.  Following with Dr. Lord.    Was diagnosed with vitamin D deficiency in the past.  Lab results back in August showed very borderline vitamin D of 30.  She has been taking drops over-the-counter but intermittently.        Review of Systems   Constitutional: Positive for fatigue. Negative for chills and fever.   HENT: Negative for congestion, hearing loss and rhinorrhea.    Eyes: Negative for pain and visual disturbance.   Respiratory: Negative for cough and shortness of breath.    Cardiovascular: Negative for chest pain and palpitations.   Gastrointestinal: Negative for abdominal pain, constipation, diarrhea, nausea and vomiting.   Genitourinary: Negative for dysuria and hematuria.   Musculoskeletal: Negative for arthralgias and myalgias.   Skin: Negative for rash.   Neurological: Negative for weakness and numbness.        Mild brain fog s/p COVID infection back in December   Psychiatric/Behavioral: Negative for dysphoric mood and sleep disturbance. The patient is not nervous/anxious.          Current Outpatient Medications:   •  albuterol sulfate  (90 Base) MCG/ACT inhaler, Inhale 2 puffs Every 4 (Four) Hours As Needed for Wheezing., Disp: 18 g, Rfl: 1  •  buPROPion XL (WELLBUTRIN XL) 150 MG 24 hr tablet, TAKE ONE TABLET BY MOUTH DAILY, Disp: 90 tablet, Rfl:  "1  •  escitalopram (LEXAPRO) 20 MG tablet, TAKE ONE TABLET BY MOUTH DAILY, Disp: 90 tablet, Rfl: 1  •  levothyroxine (SYNTHROID, LEVOTHROID) 150 MCG tablet, Take 150 mcg by mouth Daily., Disp: , Rfl:   •  pantoprazole (PROTONIX) 40 MG EC tablet, Take 1 tablet by mouth Daily., Disp: , Rfl:   •  promethazine-dextromethorphan (PROMETHAZINE-DM) 6.25-15 MG/5ML syrup, Take 5 mL by mouth 4 (Four) Times a Day As Needed for Cough., Disp: 400 mL, Rfl: 0  •  spironolactone (ALDACTONE) 50 MG tablet, TAKE ONE TABLET BY MOUTH DAILY, Disp: 90 tablet, Rfl: 0  •  sucralfate (CARAFATE) 1 g tablet, Take 1 tablet by mouth 2 (two) times a day. On an empty stomach, Disp: , Rfl:     Allergies:  Tamiflu [oseltamivir]      Objective   Vital Signs:   Vitals:    01/28/22 1029   BP: 113/60   Pulse: 80   Temp: 97.4 °F (36.3 °C)   Weight: 92.4 kg (203 lb 12.8 oz)   Height: 170.2 cm (67.01\")     Physical Exam  Vitals reviewed.   Constitutional:       General: She is not in acute distress.     Appearance: Normal appearance. She is well-developed.   HENT:      Head: Normocephalic and atraumatic. Hair is normal.      Right Ear: Hearing and external ear normal. No decreased hearing noted. No drainage.      Left Ear: Hearing and external ear normal. No decreased hearing noted.      Nose: No nasal deformity.      Mouth/Throat:      Mouth: Mucous membranes are moist.   Eyes:      General: Lids are normal.      Extraocular Movements: Extraocular movements intact.      Conjunctiva/sclera: Conjunctivae normal.      Pupils: Pupils are equal, round, and reactive to light.   Cardiovascular:      Rate and Rhythm: Normal rate and regular rhythm.      Pulses: Normal pulses.      Heart sounds: Normal heart sounds. No murmur heard.      Pulmonary:      Effort: Pulmonary effort is normal.      Breath sounds: Normal breath sounds. No wheezing or rales.   Chest:   Breasts:      Right: Normal. No inverted nipple, mass, nipple discharge, skin change or tenderness.      " Left: Normal. No inverted nipple, mass, nipple discharge, skin change or tenderness.       Abdominal:      General: Bowel sounds are normal. There is no distension.      Palpations: Abdomen is soft.      Tenderness: There is no abdominal tenderness.   Genitourinary:     General: Normal vulva.      Vagina: Normal.      Cervix: Normal.      Uterus: Normal. Not tender.       Adnexa: Right adnexa normal and left adnexa normal.        Right: No tenderness.          Left: No tenderness.     Musculoskeletal:         General: No tenderness or deformity. Normal range of motion.   Skin:     General: Skin is warm and dry.   Neurological:      Mental Status: She is alert and oriented to person, place, and time.      Motor: No abnormal muscle tone.      Deep Tendon Reflexes: Reflexes are normal and symmetric. Reflexes normal.   Psychiatric:         Mood and Affect: Mood normal.         Behavior: Behavior normal.         Thought Content: Thought content normal.         Judgment: Judgment normal.             Assessment and Plan    Diagnoses and all orders for this visit:    1. Well woman exam (Primary)  Assessment & Plan:  She is due for Pap smear; done today along with vag screen and GC/Chlam screen.  She has had abnormal pap smear in the past (3-5 years ago; she is not sure what).  She is due for breast exam; done today.  She is due for Covid, Td, flu.  Vaccines declined today.  She is due for routine blood work; ordered.    Orders:  -     IgP, Aptima HPV  -     Gardnerella vaginalis, Trichomonas vaginalis, Candida albicans, DNA - Swab, Cervix    2. Possible exposure to STD  -     Cancel: Chlamydia trachomatis, Neisseria gonorrhoeae, PCR - Urine, Urine, Random Void; Future  -     Cancel: Chlamydia trachomatis, Neisseria gonorrhoeae, PCR - Urine, Urine, Random Void  -     Chlamydia trachomatis, Neisseria gonorrhoeae, PCR - Urine, Urine, Random Void; Future    3. Mixed hyperlipidemia  Assessment & Plan:   at last check back  in August.  She may benefit from statin.  Rechecking labs today.    Orders:  -     Comprehensive Metabolic Panel; Future  -     Lipid Panel; Future    4. Vitamin D deficiency  Assessment & Plan:  Checking labs.  She may benefit from high-dose weekly replacement.    Orders:  -     Vitamin D 25 Hydroxy; Future    5. Encounter for screening for other viral diseases  -     Hepatitis C Antibody; Future    6. Acquired hypothyroidism  Assessment & Plan:  Managed by Endocrinology Dr. Lord.      7. Major depressive disorder, recurrent episode, moderate degree (HCC)  Assessment & Plan:  Stable on current regimen of Wellbutrin and Lexapro.  No refills needed today.  Continue to monitor.        Follow Up   Return in about 4 months (around 5/28/2022) for Recheck.  Patient was given instructions and counseling regarding her condition or for health maintenance advice. Please see specific information pulled into the AVS if appropriate.

## 2022-01-28 NOTE — ASSESSMENT & PLAN NOTE
Stable on current regimen of Wellbutrin and Lexapro.  No refills needed today.  Continue to monitor.

## 2022-02-01 LAB
CYTOLOGIST CVX/VAG CYTO: NORMAL
CYTOLOGY CVX/VAG DOC CYTO: NORMAL
CYTOLOGY CVX/VAG DOC THIN PREP: NORMAL
DX ICD CODE: NORMAL
HIV 1 & 2 AB SER-IMP: NORMAL
HPV I/H RISK 4 DNA CVX QL PROBE+SIG AMP: NEGATIVE
OTHER STN SPEC: NORMAL
STAT OF ADQ CVX/VAG CYTO-IMP: NORMAL

## 2022-02-14 ENCOUNTER — TELEPHONE (OUTPATIENT)
Dept: FAMILY MEDICINE CLINIC | Age: 42
End: 2022-02-14

## 2022-02-14 NOTE — TELEPHONE ENCOUNTER
Caller: Alysa Qiu    Relationship: Self    Best call back number: [phone]    What test was performed: WELL WOMAN EXAM    When was the test performed: 01/28/22    Where was the test performed: IN-OFFICE    Additional notes: THE PATIENT WOULD LIKE A CALL BACK WITH HER TEST RESULTS ASAP

## 2022-06-03 ENCOUNTER — OFFICE VISIT (OUTPATIENT)
Dept: FAMILY MEDICINE CLINIC | Age: 42
End: 2022-06-03

## 2022-06-03 ENCOUNTER — TELEPHONE (OUTPATIENT)
Dept: FAMILY MEDICINE CLINIC | Age: 42
End: 2022-06-03

## 2022-06-03 VITALS
DIASTOLIC BLOOD PRESSURE: 81 MMHG | BODY MASS INDEX: 32.02 KG/M2 | HEIGHT: 67 IN | SYSTOLIC BLOOD PRESSURE: 115 MMHG | TEMPERATURE: 97.7 F | WEIGHT: 204 LBS | HEART RATE: 61 BPM

## 2022-06-03 DIAGNOSIS — E03.9 ACQUIRED HYPOTHYROIDISM: ICD-10-CM

## 2022-06-03 DIAGNOSIS — F33.1 MAJOR DEPRESSIVE DISORDER, RECURRENT EPISODE, MODERATE DEGREE: Primary | ICD-10-CM

## 2022-06-03 DIAGNOSIS — E78.2 MIXED HYPERLIPIDEMIA: ICD-10-CM

## 2022-06-03 DIAGNOSIS — F41.9 ANXIETY: ICD-10-CM

## 2022-06-03 DIAGNOSIS — K21.9 GASTROESOPHAGEAL REFLUX DISEASE WITHOUT ESOPHAGITIS: ICD-10-CM

## 2022-06-03 DIAGNOSIS — E55.9 VITAMIN D DEFICIENCY: ICD-10-CM

## 2022-06-03 PROBLEM — Z01.419 WELL WOMAN EXAM: Status: RESOLVED | Noted: 2022-01-28 | Resolved: 2022-06-03

## 2022-06-03 PROCEDURE — 99214 OFFICE O/P EST MOD 30 MIN: CPT | Performed by: FAMILY MEDICINE

## 2022-06-03 RX ORDER — SPIRONOLACTONE 100 MG/1
1 TABLET, FILM COATED ORAL DAILY
COMMUNITY
Start: 2022-04-25

## 2022-06-03 RX ORDER — ESCITALOPRAM OXALATE 20 MG/1
20 TABLET ORAL DAILY
Qty: 90 TABLET | Refills: 1 | Status: SHIPPED | OUTPATIENT
Start: 2022-06-03 | End: 2022-12-28

## 2022-06-03 NOTE — ASSESSMENT & PLAN NOTE
No room to go up on the Wellbutrin as a higher dose in the past has caused her emotional numbing.  She is likewise maxed out on the Lexapro.  She is concerned about having to possibly switch to other medications due to fears of side effects as well as having to go off of something that is at least keeping her functioning for the moment.  We discussed today that it would probably be helpful to get her in with [facility] for assistance with medication management and adjustments.  She is agreeable to this, so we will place that referral today.  Since she will hopefully be able to see Joaquina fairly soon, I will not attempt any adjustments to her regimen today.

## 2022-06-03 NOTE — PROGRESS NOTES
"Chief Complaint  Med Refill (4 month follow up) and Depression    Subjective          Alysa Qiu presents to [facility] today for routine f/u of chronic issues.    She is on Wellbutrin and Lexapro for depression and anxiety.  \"I'm not happy.  I'm sad and that's not getting any better.\"  She is tired all the time.  She is now working 6 days a week.  She did quit smoking.  She has no energy.  She has been trying to lose some weight and has cut back on sweetened beverages but despite this has not lost any, which has been frustrating for her.  Her mood is \"not good.\"  She has been up at 300 mg of the Wellbutrin in the past but it caused emotional numbing.    She was due to get some labs after her last appointment back in January but because of some insurance issues never had those done.  She thinks those issues will be cleared up within the next couple of weeks and wants to come back in at that time to get the labs    She is on levothyroxine for hypothyroidism.  Following with Dr. Dave Lord.  He manages these meds for her.    She is on spironolactone for acne.  That works pretty well for her.     She is on pantoprazole for GERD.  Also taking sucralfate twice daily as needed.      Current Outpatient Medications:   •  albuterol sulfate  (90 Base) MCG/ACT inhaler, Inhale 2 puffs Every 4 (Four) Hours As Needed for Wheezing., Disp: 18 g, Rfl: 1  •  levothyroxine (SYNTHROID, LEVOTHROID) 150 MCG tablet, Take 150 mcg by mouth Daily., Disp: , Rfl:   •  pantoprazole (PROTONIX) 40 MG EC tablet, Take 1 tablet by mouth Daily., Disp: , Rfl:   •  spironolactone (ALDACTONE) 100 MG tablet, Take 1 tablet by mouth Daily., Disp: , Rfl:   •  sucralfate (CARAFATE) 1 g tablet, Take 1 tablet by mouth 2 (Two) Times a Day As Needed. On an empty stomach, Disp: , Rfl:   •  buPROPion XL (WELLBUTRIN XL) 150 MG 24 hr tablet, TAKE ONE TABLET BY MOUTH DAILY, Disp: 90 tablet, Rfl: 1  •  Cholecalciferol " "50 MCG (2000 UT) tablet, Take 2,000 Units by mouth Daily., Disp: , Rfl:   •  escitalopram (LEXAPRO) 20 MG tablet, Take 1 tablet by mouth Daily., Disp: 90 tablet, Rfl: 1  •  traZODone (DESYREL) 50 MG tablet, Take 0.5 tablets by mouth Every Night. Indications: Trouble Sleeping, Major Depressive Disorder, Disp: 15 tablet, Rfl: 1    Allergies:  Tamiflu [oseltamivir]      Objective   Vital Signs:   Vitals:    06/03/22 0806   BP: 115/81   BP Location: Right arm   Patient Position: Sitting   Pulse: 61   Temp: 97.7 °F (36.5 °C)   TempSrc: Oral   Weight: 92.5 kg (204 lb)   Height: 170.2 cm (67.01\")       Physical Exam  Vitals reviewed.   Constitutional:       General: She is not in acute distress.     Appearance: Normal appearance. She is well-developed.   HENT:      Head: Normocephalic and atraumatic.      Right Ear: External ear normal.      Left Ear: External ear normal.   Eyes:      Extraocular Movements: Extraocular movements intact.      Conjunctiva/sclera: Conjunctivae normal.      Pupils: Pupils are equal, round, and reactive to light.   Cardiovascular:      Rate and Rhythm: Normal rate and regular rhythm.      Heart sounds: No murmur heard.  Pulmonary:      Effort: Pulmonary effort is normal.      Breath sounds: Normal breath sounds. No wheezing, rhonchi or rales.   Abdominal:      General: Bowel sounds are normal. There is no distension.      Palpations: Abdomen is soft.      Tenderness: There is no abdominal tenderness.   Musculoskeletal:         General: Normal range of motion.   Neurological:      Mental Status: She is alert.   Psychiatric:         Mood and Affect: Affect normal.             Assessment and Plan    Diagnoses and all orders for this visit:    1. Major depressive disorder, recurrent episode, moderate degree (HCC) (Primary)  Assessment & Plan:  No room to go up on the Wellbutrin as a higher dose in the past has caused her emotional numbing.  She is likewise maxed out on the Lexapro.  She is " concerned about having to possibly switch to other medications due to fears of side effects as well as having to go off of something that is at least keeping her functioning for the moment.  We discussed today that it would probably be helpful to get her in with [facility] for assistance with medication management and adjustments.  She is agreeable to this, so we will place that referral today.  Since she will hopefully be able to see Joaquina fairly soon, I will not attempt any adjustments to her regimen today.    Orders:  -     Ambulatory Referral to Psychiatry    2. Anxiety  Assessment & Plan:  As per depression plan.    Orders:  -     Ambulatory Referral to Psychiatry    3. Acquired hypothyroidism    4. Gastroesophageal reflux disease without esophagitis  Assessment & Plan:  Stable on pantoprazole 40 mg daily.  No refills needed.  Continue to monitor.      5. Vitamin D deficiency  Assessment & Plan:  Will be due for labs as per hyperlipidemia plan.      6. Mixed hyperlipidemia  Assessment & Plan:  Due for labs.  She will call for these in the next week or 2 after she gets her insurance issues ironed out.        Follow Up   Return in about 6 months (around 12/3/2022) for Recheck.  Patient was given instructions and counseling regarding her condition or for health maintenance advice. Please see specific information pulled into the AVS if appropriate.

## 2022-06-03 NOTE — ASSESSMENT & PLAN NOTE
Due for labs.  She will call for these in the next week or 2 after she gets her insurance issues ironed out.

## 2022-06-24 ENCOUNTER — OFFICE VISIT (OUTPATIENT)
Dept: BEHAVIORAL HEALTH | Facility: CLINIC | Age: 42
End: 2022-06-24

## 2022-06-24 VITALS
WEIGHT: 208 LBS | DIASTOLIC BLOOD PRESSURE: 70 MMHG | HEIGHT: 67 IN | BODY MASS INDEX: 32.65 KG/M2 | OXYGEN SATURATION: 97 % | SYSTOLIC BLOOD PRESSURE: 110 MMHG | HEART RATE: 99 BPM

## 2022-06-24 DIAGNOSIS — F41.1 GENERALIZED ANXIETY DISORDER: ICD-10-CM

## 2022-06-24 DIAGNOSIS — F43.21 COMPLICATED GRIEF: ICD-10-CM

## 2022-06-24 DIAGNOSIS — F33.1 MAJOR DEPRESSIVE DISORDER, RECURRENT EPISODE, MODERATE: Primary | ICD-10-CM

## 2022-06-24 DIAGNOSIS — F51.05 INSOMNIA SECONDARY TO DEPRESSION WITH ANXIETY: ICD-10-CM

## 2022-06-24 DIAGNOSIS — F41.8 INSOMNIA SECONDARY TO DEPRESSION WITH ANXIETY: ICD-10-CM

## 2022-06-24 PROCEDURE — 90792 PSYCH DIAG EVAL W/MED SRVCS: CPT | Performed by: NURSE PRACTITIONER

## 2022-06-24 RX ORDER — TRAZODONE HYDROCHLORIDE 50 MG/1
25 TABLET ORAL NIGHTLY
Qty: 15 TABLET | Refills: 1 | Status: SHIPPED | OUTPATIENT
Start: 2022-06-24

## 2022-06-24 NOTE — PATIENT INSTRUCTIONS
1.  Please return to clinic at your next scheduled visit.  Contact the clinic ([phone]) at least 24 hours prior in the event you need to cancel.  2.  Do no harm to yourself or others.    3.  Avoid alcohol and drugs.    4.  Take all medications as prescribed.  Please contact the clinic with any concerns. If you are in need of medication refills, please call the clinic at [phone].    5. Should you want to get in touch with your provider, YESY Carroll, please utilize [employer] message or contact the office ([phone]), and staff will be able to page the provider on call directly.  6.  In the event you have personal crisis, contact the following crisis numbers: Suicide Prevention Hotline 1-318.846.6693; [facility] Helpline 4-547-109-LFLX; [facility] [phone]; text HELLO to 113824; or 277.  7.  Please feel free to contact my Medical Assistant, Karen, directly at [phone], please leave a voice mail if you do not get an answer, and she will return your call within 24 hrs.      SPECIFIC RECOMMENDATIONS:     1.      Medications discussed at this encounter:                   - Start Trazodone 25 mg (break 50 mg tab in half) by mouth nightly for insomnia and depression     2.      Psychotherapy recommendations:  referral ordered, Patient to contact provider and set up appointment.  And to contact office if unable to get an appointment scheduled.    Brit Nieto [facility]  Address: [address] [facility] [address], [place] [address], Phone:  [phone]  or  [phone]   Services offered: Family, couples, and individual therapy for a wide variety of areas, such as mood disorders, personality disorders, psychosis, addiction, anxiety, coping skills, grief, trauma and PTSD, transgender, self-harming, suicidal ideation, and other. Multiple types of therapy offered, including dialectical, trauma focused, and more.  Insurance accepted: [employer] and  [employer], [facility], [employer], Medicaid, [employer], Out of Network.      3.     Return to clinic: 4 weeks    Please arrive at least 10 minutes before your scheduled appointment time to complete check in process.

## 2022-06-24 NOTE — PROGRESS NOTES
"Aurelia Qiu is a 41 y.o. female who presents today for initial evaluation     Referring Provider:  Ann Melvin MD  [id number]  [address] [address]    Chief Complaint:  Depression    History of Present Illness: Patient presents today in office with a history of depression.  Patient has been treated with Wellbutrin XL and Lexapro since 2006.  Higher doses of Wellbutrin XL has caused emotional blunting, patient remains on 150 mg.  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  Patient has never seen a specialist for behavioral health, though did receive therapy over a course of approximately 10 yrs which was effective.     Depression: Patient complains of depression. She complains of anhedonia, depressed mood, difficulty concentrating, fatigue, feelings of worthlessness/guilt and insomnia  Which have been worse since loss of son in 2019, \"There is no good day.\" patient admits to crying often, falling asleep at desk due to fatigue.  Patient sleep is interrupted, waking up at least 2-3 times per night, sleeping approximately 4-20 hrs.  Patient is not missing work, though is concerned, as fellow co-workers have noticed patients exhaustion, \"they can see it.\"  Able to complete tasks at work, though at home neglecting house hold tasks. Works 6 days per week, due to ex- having stopped paying child support a few months ago.   2018 after marriage of 15 ys prior to sonTam, commiting suicide. Describes past relationship with father of 2 children was not healthy.     Patient wishes to start therapy again.  Patient feels current medications have been effective, \"But I know I can feel better because I have, and I know it has to be therapy too.\"   Patient admits to current use of Alcohol 2-3 beers at least 4 days per week and Marijuana use less than once weekly.  Alcohol use increased after death of son.      PHQ-9 " Depression Screening  PHQ-9 Total Score: 16    Little interest or pleasure in doing things? 1-->several days   Feeling down, depressed, or hopeless? 2-->more than half the days   Trouble falling or staying asleep, or sleeping too much? 3-->nearly every day   Feeling tired or having little energy? 3-->nearly every day   Poor appetite or overeating? 2-->more than half the days   Feeling bad about yourself - or that you are a failure or have let yourself or your family down? 2-->more than half the days   Trouble concentrating on things, such as reading the newspaper or watching television? 2-->more than half the days   Moving or speaking so slowly that other people could have noticed? Or the opposite - being so fidgety or restless that you have been moving around a lot more than usual? 1-->several days   Thoughts that you would be better off dead, or of hurting yourself in some way? 0-->not at all   PHQ-9 Total Score 16     JEANNETTE-7  Feeling nervous, anxious or on edge: More than half the days  Not being able to stop or control worrying: Several days  Worrying too much about different things: Nearly every day  Trouble Relaxing: More than half the days  Being so restless that it is hard to sit still: Nearly every day  Feeling afraid as if something awful might happen: More than half the days  Becoming easily annoyed or irritable: More than half the days  JEANNETTE 7 Total Score: 15  If you checked any problems, how difficult have these problems made it for you to do your work, take care of things at home, or get along with other people: Very difficult    Past Surgical History:  Past Surgical History:   Procedure Laterality Date   • APPENDECTOMY     •  SECTION      x2       Problem List:  Patient Active Problem List   Diagnosis   • History of COVID-19   • Acquired hypothyroidism   • HLA B27 (HLA B27 positive)   • Anxiety   • Major depressive disorder, recurrent episode, moderate degree (HCC)   • Mixed hyperlipidemia   •  Vitamin D deficiency   • Gastroesophageal reflux disease without esophagitis       Allergy:   Allergies   Allergen Reactions   • Tamiflu [Oseltamivir] Mental Status Change     Mental Fog        Discontinued Medications:  There are no discontinued medications.    Current Medications:   Current Outpatient Medications   Medication Sig Dispense Refill   • albuterol sulfate  (90 Base) MCG/ACT inhaler Inhale 2 puffs Every 4 (Four) Hours As Needed for Wheezing. 18 g 1   • buPROPion XL (WELLBUTRIN XL) 150 MG 24 hr tablet TAKE ONE TABLET BY MOUTH DAILY 90 tablet 1   • escitalopram (LEXAPRO) 20 MG tablet Take 1 tablet by mouth Daily. 90 tablet 1   • levothyroxine (SYNTHROID, LEVOTHROID) 150 MCG tablet Take 150 mcg by mouth Daily.     • pantoprazole (PROTONIX) 40 MG EC tablet Take 1 tablet by mouth Daily.     • spironolactone (ALDACTONE) 100 MG tablet Take 1 tablet by mouth Daily.     • sucralfate (CARAFATE) 1 g tablet Take 1 tablet by mouth 2 (Two) Times a Day As Needed. On an empty stomach     • traZODone (DESYREL) 50 MG tablet Take 0.5 tablets by mouth Every Night. Indications: Trouble Sleeping, Major Depressive Disorder 15 tablet 1     No current facility-administered medications for this visit.       Past Medical History:  Past Medical History:   Diagnosis Date   • Acne vulgaris    • Anxiety    • Chronic pain disorder    • GERD without esophagitis    • Hyperlipidemia    • Hypothyroidism    • Major depressive disorder    • PTSD (post-traumatic stress disorder)    • Substance abuse (HCC)    • Vitamin B12 deficiency anemia due to intrinsic factor deficiency    • Vitamin D deficiency        Past Psychiatric History:  Began Treatment:post partum dep after first child 2001  Diagnoses:Depression and Anxiety  Psychiatrist:Christopher  Therapist:Hesham Grimm-[facility] in [place]-seen for several years off and on 10 yrs; last seen 1 yr ago  Admission History:Denies   Medication Trials:Xanax prn 2019 after son's death, Valium  2.5 tid prn 2018-not refilled later in 2018 due to admit of smoking MJ Effexor-couldn't tolerate felt worse if missed dose by an hour.  Self Harm: Denies  Suicide Attempts:Denies   Psychosis, Anxiety, Depression: PPD  after birth of first child-treated with med possibly Prozac    Substance Abuse History:   Types:Alcohol 4 days per week, 2-3 beers per session, since loss of son 2019; smokes Marijuana less than once per week-1 bowl  Withdrawal Symptoms:Denies  Longest Period Sober:Not Applicable   AA: No     CAGE screening test for Alcoholism:   Have you ever felt the need to Cut down on Drinking? no   Have you ever felt Annoyed by criticism of your drinking? no   Have you ever felt Guilty about your drinking? no   Have you ever taken a morning Eye opener? 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, where  for  (dermatology)  Kids:Yes or If so, how many 2- 1 son committed suicide in 2019 at age 18; 1 dtr age 17  House:Lives in a house   History: Denies  Access to Guns:  Yes, put away    Social History     Socioeconomic History   • Marital status:    Tobacco Use   • Smoking status: Current Every Day Smoker     Packs/day: 0.25     Types: Cigarettes   • Smokeless tobacco: Never Used   • Tobacco comment: 1-5 cigs per day   Vaping Use   • Vaping Use: Every day   • Substances: Nicotine, THC, CBD, Flavoring   Substance and Sexual Activity   • Alcohol use: Yes     Comment: greater than 4 days a week   • Drug use: Yes     Types: Marijuana   • Sexual activity: Yes     Birth control/protection: I.U.D.       Family History:   Suicide Attempts: son at age 18 in 2019, self inflicted GSW  Suicide Completions:son at age 18 in 2019, self inflicted GSW      Family History   Problem Relation Age of Onset   • Depression Mother    • Hyperlipidemia Mother    • Depression Father    • Alcohol abuse Father    • Hyperlipidemia Father    •  Depression Maternal Grandfather    • Anxiety disorder Maternal Grandfather    • Depression Maternal Grandmother    • Self-Injurious Behavior  Son    • Suicide Attempts Son    • Depression Son    • Suicidality Son        Developmental History:   Born: [place]  Siblings:no  Childhood: Denies Abuse  High School:Completed  College:[employer]    Mental Status Exam:   Hygiene:   good  Cooperation:  Cooperative  Eye Contact:  Good  Psychomotor Behavior:  Appropriate  Affect:  Appropriate  Mood: sad, depressed and anxious  Speech:  Normal  Thought Process:  Goal directed  Thought Content:  Mood congruent  Suicidal:  None  Homicidal:  None  Hallucinations:  None  Delusion:  None  Memory:  Intact  Orientation:  Person, Place, Time and Situation  Reliability:  good  Insight:  Good  Judgement:  Good  Impulse Control:  Good  Physical/Medical Issues:  Yes HLD,Hypothyroidism,GERD,Acne, Low Vit D; Hashimotos dx in 2018     Review of Systems:  Review of Systems   Constitutional: Positive for fatigue. Negative for diaphoresis.   HENT: Negative for drooling.    Eyes: Negative for visual disturbance.   Respiratory: Negative for cough and shortness of breath.    Cardiovascular: Negative for chest pain, palpitations and leg swelling.   Gastrointestinal: Negative for nausea and vomiting.   Endocrine: Positive for heat intolerance. Negative for cold intolerance.   Genitourinary: Negative for difficulty urinating.   Musculoskeletal: Negative for joint swelling.   Allergic/Immunologic: Negative for immunocompromised state.   Neurological: Negative for dizziness, seizures, speech difficulty and numbness.   Psychiatric/Behavioral: Positive for decreased concentration and sleep disturbance. Negative for hallucinations, self-injury and suicidal ideas. The patient is nervous/anxious. The patient is not hyperactive.          Physical Exam:  Physical Exam  Psychiatric:         Attention and Perception: Attention and perception normal.         Mood and  "Affect: Mood is anxious and depressed. Affect is tearful.         Speech: Speech normal.         Behavior: Behavior normal. Behavior is cooperative.         Thought Content: Thought content normal. Thought content does not include suicidal ideation. Thought content does not include suicidal plan.         Cognition and Memory: Cognition and memory normal.         Judgment: Judgment normal.         Vital Signs:   /70   Pulse 99   Ht 170.2 cm (67.01\")   Wt 94.3 kg (208 lb)   SpO2 97%   BMI 32.57 kg/m²      Lab Results:   Office Visit on 01/28/2022   Component Date Value Ref Range Status   • Diagnosis 01/28/2022 Comment   Final    NEGATIVE FOR INTRAEPITHELIAL LESION OR MALIGNANCY.   • Specimen adequacy: 01/28/2022 Comment   Final    Satisfactory for evaluation.  Endocervical and/or squamous metaplastic  cells (endocervical component) are present.  Areas of partially obscuring inflammatory exudate are present.   • Clinician Provided ICD-10: 01/28/2022 Comment   Final    Z01.419   • Performed by: 01/28/2022 Comment   Final    Misa Ann, Cytotechnologist (ASCP)   • . 01/28/2022 .   Final   • Note: 01/28/2022 Comment   Final    The Pap smear is a screening test designed to aid in the detection of  premalignant and malignant conditions of the uterine cervix.  It is not a  diagnostic procedure and should not be used as the sole means of detecting  cervical cancer.  Both false-positive and false-negative reports do occur.   • Method: 01/28/2022 Comment   Final    This liquid based ThinPrep(R) pap test was screened with the  use of an image guided system.   • HPV Aptima 01/28/2022 Negative  Negative Final    This nucleic acid amplification test detects fourteen high-risk  HPV types (16,18,31,33,35,39,45,51,52,56,58,59,66,68) without  differentiation.   • GARDNERELLA VAGINALIS 01/28/2022 Positive (A) Negative Final   • TRICHOMONAS VAGINALIS 01/28/2022 Negative  Negative Final   • CANDIDA SPECIES 01/28/2022 " Negative  Negative Final   Office Visit on 12/29/2021   Component Date Value Ref Range Status   • SARS Antigen 12/29/2021 Detected (A) Not Detected Final   • Influenza A Antigen STAN 12/29/2021 Not Detected   Final   • Influenza B Antigen STAN 12/29/2021 Not Detected   Final   • Internal Control 12/29/2021 Passed  Passed Final   • Lot Number 12/29/2021 [id number]   Final   • Expiration Date 12/29/2021 10/30/22   Final       EKG Results:  No orders to display       Imaging Results:  No Images in the past 120 days found..      Assessment & Plan   Diagnoses and all orders for this visit:    1. Major depressive disorder, recurrent episode, moderate (HCC) (Primary)  -     traZODone (DESYREL) 50 MG tablet; Take 0.5 tablets by mouth Every Night. Indications: Trouble Sleeping, Major Depressive Disorder  Dispense: 15 tablet; Refill: 1  -     Ambulatory Referral to Psychotherapy    2. Generalized anxiety disorder  -     Ambulatory Referral to Psychotherapy    3. Insomnia secondary to depression with anxiety  -     traZODone (DESYREL) 50 MG tablet; Take 0.5 tablets by mouth Every Night. Indications: Trouble Sleeping, Major Depressive Disorder  Dispense: 15 tablet; Refill: 1  -     Ambulatory Referral to Psychotherapy    4. Complicated grief  -     Ambulatory Referral to Psychotherapy        Visit Diagnoses:    ICD-10-CM ICD-9-CM   1. Major depressive disorder, recurrent episode, moderate (HCC)  F33.1 296.32   2. Generalized anxiety disorder  F41.1 300.02   3. Insomnia secondary to depression with anxiety  F51.05 300.4    F41.8 327.02   4. Complicated grief  F43.21 309.0       PLAN:  1. Safety: No acute safety concerns  2. Therapy: Referral Made Patient to contact provider and set up appointment.  And to contact office if unable to get an appointment scheduled.  Brit Nieto [facility]  Address: [facility] [address] [facility] [address], [place] [address], Phone:  [phone]  or  [phone]   Services offered: Family, couples,  and individual therapy for a wide variety of areas, such as mood disorders, personality disorders, psychosis, addiction, anxiety, coping skills, grief, trauma and PTSD, transgender, self-harming, suicidal ideation, and other. Multiple types of therapy offered, including dialectical, trauma focused, and more.  Insurance accepted: [place], [place], [employer] and [facility], [facility], [employer], Medicaid, WellCare, Out of Network.  3. Risk Assessment: Risk of self-harm acutely is moderate.  Risk factors include anxiety disorder, mood disorder, family history, access to guns/weapons, alcohol and marijuana use currently, and recent psychosocial stressors (pandemic). Protective factors include, no present SI, no history of suicide attempts or self-harm in the past, healthcare seeking, future orientation, willingness to engage in care.  Risk of self-harm chronically is also moderate, but could be further elevated in the event of treatment noncompliance and/or AODA.  4. Meds: Continue Wellbutrin  mg by mouth daily in the morning to target depression and anxiety. Discussed all risks, benefits, alternatives, and side effects of Bupropion/Wellbutrin including but not limited to GI upset (N/V/D, constipation), tachycardia, diaphoresis, weight loss, agitation, dizziness, headache, insomnia, tremor, blurred vision, anorexia, HTN, activation of daly or hypomania, CNS stimulation and neuropsychiatric effects, ocular effects, seizure risk, withdrawal syndrome following abrupt discontinuation, and activation of suicidal ideation and behavior. Patient  educated on the need to practice safe sex while taking this med. Discussed the need for patient to immediately call the office for any new or worsening symptoms, such as worsening depression; feeling nervous or restless; suicidal thoughts or actions; or other changes in mood or behavior, and all other concerns. Patient educated on medication compliance. Patient  verbalized  understanding and is agreeable to taking Bupropion/Wellbutrin. Addressed all questions and concerns.   Continue Lexapro 20 mg by mouth daily to target anxiety and depression.  Risks, benefits, alternatives discussed with patient including GI upset, nausea vomiting diarrhea, theoretical decrease of seizure threshold predisposing the patient to a slightly higher seizure risk, headaches, sexual dysfunction, serotonin syndrome, bleeding risk, increased suicidality in patients 24 years and younger.  After discussion of these risks and benefits, the patient voiced understanding and agreed to proceed.  Start Trazodone 25 mg by mouth nightly to target insomnia and depression.  Instructed to break 50 mg tablet in half to equal 25 mg. Risks, benefits, side effects discussed with patient including GI upset, sedation, dizziness/falls risk, grogginess the following day, prolongation of the QTc interval.  After discussion of these risks and benefits, the patient voiced understanding and agreed to proceed.    5. Labs: instructed patient to proceed to lab after visit for past due labs per PCP, however, patient has already eaten this morning, and would not be able to return later in the afternoon due to having to go to work after.  Patient employer does not draw blood in the office as that option was discussed.   6. MR authorization form signed to give [facility] consent to verify appointment of initial evaluation with Brit Nieto.  Form sent to provider via secure email site sendinc per provider request.     Patient wishes to remain on current medications and start therapy as it has been helpful in the past.  Patient has had an increase in alcohol and marijuana use since death of son in 2019, to cope with feelings.  Unable to increase dose of Wellbutrin XL due to past intolerance at higher doses, having been on both medications for 15 yrs.  Patient denies withdrawal symptoms, and no history of seizures, however, if alcohol  consumption continues to increase will consider tapering down Wellbutrin XL due to risk of lowering seizure threshold.     Patient screened positive for depression based on a PHQ-9 score of 16 on 6/24/2022. Follow-up recommendations include: Prescribed antidepressant medication treatment, Referral to Social Work and Suicide Risk Assessment performed.      TREATMENT PLAN/GOALS: Continue supportive psychotherapy efforts and medications as indicated. Treatment and medication options discussed during today's visit. Patient acknowledged and verbally consented to continue with current treatment plan and was educated on the importance of compliance with treatment and follow-up appointments.    MEDICATION ISSUES:  LIVIER reviewed as expected.  Discussed medication options and treatment plan of prescribed medication as well as the risks, benefits, and side effects including potential falls, possible impaired driving and metabolic adversities among others. Patient is agreeable to call the office with any worsening of symptoms or onset of side effects. Patient is agreeable to call 911 or go to the nearest ER should he/she begin having SI/HI. No medication side effects or related complaints today.     MEDS ORDERED DURING VISIT:  New Medications Ordered This Visit   Medications   • traZODone (DESYREL) 50 MG tablet     Sig: Take 0.5 tablets by mouth Every Night. Indications: Trouble Sleeping, Major Depressive Disorder     Dispense:  15 tablet     Refill:  1       Return in about 4 weeks (around 7/22/2022) for Next scheduled follow up.         I spent 64 minutes caring for Alysa on this date of service. This time includes time spent by me in the following activities: preparing for the visit, reviewing tests, obtaining and/or reviewing a separately obtained history, performing a medically appropriate examination and/or evaluation, counseling and educating the patient/family/caregiver, ordering medications, tests, or procedures,  referring and communicating with other health care professionals, documenting information in the medical record and care coordination.      This document has been electronically signed by YESY Carroll  June 24, 2022 17:20 EDT      Part of this note may be an electronic transcription/translation of spoken language to printed text using the Dragon Dictation System.

## 2022-07-22 ENCOUNTER — OFFICE VISIT (OUTPATIENT)
Dept: BEHAVIORAL HEALTH | Facility: CLINIC | Age: 42
End: 2022-07-22

## 2022-07-22 VITALS
BODY MASS INDEX: 32.58 KG/M2 | DIASTOLIC BLOOD PRESSURE: 72 MMHG | SYSTOLIC BLOOD PRESSURE: 128 MMHG | WEIGHT: 207.6 LBS | OXYGEN SATURATION: 99 % | HEART RATE: 76 BPM | HEIGHT: 67 IN

## 2022-07-22 DIAGNOSIS — F33.1 MAJOR DEPRESSIVE DISORDER, RECURRENT EPISODE, MODERATE: Primary | ICD-10-CM

## 2022-07-22 DIAGNOSIS — F41.1 GENERALIZED ANXIETY DISORDER: ICD-10-CM

## 2022-07-22 DIAGNOSIS — F41.8 INSOMNIA SECONDARY TO DEPRESSION WITH ANXIETY: ICD-10-CM

## 2022-07-22 DIAGNOSIS — F51.05 INSOMNIA SECONDARY TO DEPRESSION WITH ANXIETY: ICD-10-CM

## 2022-07-22 PROCEDURE — 99213 OFFICE O/P EST LOW 20 MIN: CPT | Performed by: NURSE PRACTITIONER

## 2022-07-22 NOTE — PATIENT INSTRUCTIONS
1.  Please return to clinic at your next scheduled visit.  Contact the clinic ([phone]) at least 24 hours prior in the event you need to cancel.  2.  Do no harm to yourself or others.    3.  Avoid alcohol and drugs.    4.  Take all medications as prescribed.  Please contact the clinic with any concerns. If you are in need of medication refills, please call the clinic at [phone].    5. Should you want to get in touch with your provider, YESY Carroll, please utilize [employer] or contact the office ([phone]), and staff will be able to page the provider on call directly.  6.  In the event you have personal crisis, contact the following crisis numbers: Suicide Prevention Hotline 1-534.583.6628; RYLEE Helpline 9-969-392-EVTA; [facility] Emergency Room [phone]; text HELLO to 705195; or 758.  7.  Please feel free to contact my Medical Assistant, Karen, directly at [phone], please leave a voice mail if you do not get an answer, and she will return your call within 24 hrs.      SPECIFIC RECOMMENDATIONS:     1.      Medications discussed at this encounter:                   -      2.      Psychotherapy recommendations: Contact therapist to set up an appointment   Brit Nieto LM  Address: [facility] [address] [facility] [address], [place] [address], Phone:  [phone]  or  [phone]   Services offered: Family, couples, and individual therapy for a wide variety of areas, such as mood disorders, personality disorders, psychosis, addiction, anxiety, coping skills, grief, trauma and PTSD, transgender, self-harming, suicidal ideation, and other. Multiple types of therapy offered, including dialectical, trauma focused, and more.  Insurance accepted: [place], [place], [place] [facility], [facility], [facility], Medicaid, WellCare, Out of Network.      3.     Return to clinic: 6 weeks    Please arrive at least 15 minutes before your scheduled appointment time to complete  check in process.

## 2022-07-22 NOTE — PROGRESS NOTES
"Aurelia Qiu is a 42 y.o. female who presents today for follow up    Referring Provider:  No referring provider defined for this encounter.    Chief Complaint:  Depression, insomnia, medication check     History of Present Illness:   7/22/22:  Patient presents today in office reports zhang COVID, death in family, and has not scheduled therapy appointment. Patient did take the Trazodone 25 mg once since last visit. \"I think I am scared I will be foggy or tired next morning.\" Patient denies feeling grogginess following day after taking.    Started Trazodone and referred for therapy at last visit. Labs not completed as previously instructed as PCP had ordered 1/28/22.    Patient works Mon-Sat and is willing to try taking Trazodone again this coming Saturday.      Due to COVID patient has been feeling very fatigued, \"brain fog\", and due to hypothyroidism has been exhausted.  Patient voiced inability to gauge mood due to illness.     6/24/22: INITIAL EVAL  Patient presents today in office with a history of depression.  Patient has been treated with Wellbutrin XL and Lexapro since 2006.  Higher doses of Wellbutrin XL has caused emotional blunting, patient remains on 150 mg.  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  Patient has never seen a specialist for behavioral health, though did receive therapy over a course of approximately 10 yrs which was effective.     Depression: Patient complains of depression. She complains of anhedonia, depressed mood, difficulty concentrating, fatigue, feelings of worthlessness/guilt and insomnia  Which have been worse since loss of son in 2019, \"There is no good day.\" patient admits to crying often, falling asleep at desk due to fatigue.  Patient sleep is interrupted, waking up at least 2-3 times per night, sleeping approximately 4-20 hrs.  Patient is not missing work, though is concerned, as fellow co-workers have " "noticed patients exhaustion, \"they can see it.\"  Able to complete tasks at work, though at home neglecting house hold tasks. Works 6 days per week, due to ex- having stopped paying child support a few months ago.   2018 after marriage of 15 ys prior to sonTam, commiting suicide. Describes past relationship with father of 2 children was not healthy.     Patient wishes to start therapy again.  Patient feels current medications have been effective, \"But I know I can feel better because I have, and I know it has to be therapy too.\"   Patient admits to current use of Alcohol 2-3 beers at least 4 days per week and Marijuana use less than once weekly.  Alcohol use increased after death of son.      PHQ-9 Depression Screening  PHQ-9 Total Score:   2022 16 , reassess  2022    Little interest or pleasure in doing things?     Feeling down, depressed, or hopeless?     Trouble falling or staying asleep, or sleeping too much?     Feeling tired or having little energy?     Poor appetite or overeating?     Feeling bad about yourself - or that you are a failure or have let yourself or your family down?     Trouble concentrating on things, such as reading the newspaper or watching television?     Moving or speaking so slowly that other people could have noticed? Or the opposite - being so fidgety or restless that you have been moving around a lot more than usual?     Thoughts that you would be better off dead, or of hurting yourself in some way?     PHQ-9 Total Score       JEANNETTE-7    2022 15, reassess  2022    Past Surgical History:  Past Surgical History:   Procedure Laterality Date   • APPENDECTOMY     •  SECTION      x2       Problem List:  Patient Active Problem List   Diagnosis   • History of COVID-19   • Acquired hypothyroidism   • HLA B27 (HLA B27 positive)   • Anxiety   • Major depressive disorder, recurrent episode, moderate degree (HCC)   • Mixed hyperlipidemia   • Vitamin D " deficiency   • Gastroesophageal reflux disease without esophagitis       Allergy:   Allergies   Allergen Reactions   • Tamiflu [Oseltamivir] Mental Status Change     Mental Fog        Discontinued Medications:  There are no discontinued medications.    Current Medications:   Current Outpatient Medications   Medication Sig Dispense Refill   • albuterol sulfate  (90 Base) MCG/ACT inhaler Inhale 2 puffs Every 4 (Four) Hours As Needed for Wheezing. 18 g 1   • buPROPion XL (WELLBUTRIN XL) 150 MG 24 hr tablet TAKE ONE TABLET BY MOUTH DAILY 90 tablet 1   • escitalopram (LEXAPRO) 20 MG tablet Take 1 tablet by mouth Daily. 90 tablet 1   • levothyroxine (SYNTHROID, LEVOTHROID) 150 MCG tablet Take 150 mcg by mouth Daily.     • pantoprazole (PROTONIX) 40 MG EC tablet Take 1 tablet by mouth Daily.     • spironolactone (ALDACTONE) 100 MG tablet Take 1 tablet by mouth Daily.     • sucralfate (CARAFATE) 1 g tablet Take 1 tablet by mouth 2 (Two) Times a Day As Needed. On an empty stomach     • traZODone (DESYREL) 50 MG tablet Take 0.5 tablets by mouth Every Night. Indications: Trouble Sleeping, Major Depressive Disorder 15 tablet 1     No current facility-administered medications for this visit.       Past Medical History:  Past Medical History:   Diagnosis Date   • Acne vulgaris    • Anxiety    • Chronic pain disorder    • GERD without esophagitis    • Hyperlipidemia    • Hypothyroidism    • Major depressive disorder    • PTSD (post-traumatic stress disorder)    • Substance abuse (HCC)    • Vitamin B12 deficiency anemia due to intrinsic factor deficiency    • Vitamin D deficiency        Past Psychiatric History:  Began Treatment:post partum dep after first child 2001  Diagnoses:Depression and Anxiety  Psychiatrist:Christopher  Therapist:[facility] in [place]-seen for several years off and on 10 yrs; last seen 1 yr ago  Admission History:Denies   Medication Trials:Xanax prn 2019 after son's death, Valium 2.5 tid prn  2018-not refilled later in 2018 due to admit of smoking MJ Effexor-couldn't tolerate felt worse if missed dose by an hour.  Self Harm: Denies  Suicide Attempts:Denies   Psychosis, Anxiety, Depression: PPD  after birth of first child-treated with med possibly Prozac    Substance Abuse History:   Types:Alcohol 4 days per week, 2-3 beers per session, since loss of son 2019; smokes Marijuana less than once per week-1 bowl  Withdrawal Symptoms:Denies  Longest Period Sober:Not Applicable   AA: No     CAGE screening test for Alcoholism:   Have you ever felt the need to Cut down on Drinking? no   Have you ever felt Annoyed by criticism of your drinking? no   Have you ever felt Guilty about your drinking? no   Have you ever taken a morning Eye opener? 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, where  for  (dermatology)  Kids:Yes or If so, how many 2- 1 son committed suicide in 2019 at age 18; 1 dtr age 17  House:Lives in a house   History: Denies  Access to Guns:  Yes, put away    Social History     Socioeconomic History   • Marital status:    Tobacco Use   • Smoking status: Current Every Day Smoker     Packs/day: 0.25     Types: Cigarettes   • Smokeless tobacco: Never Used   • Tobacco comment: 1-5 cigs per day   Vaping Use   • Vaping Use: Every day   • Substances: Nicotine, THC, CBD, Flavoring   Substance and Sexual Activity   • Alcohol use: Yes     Comment: greater than 4 days a week   • Drug use: Yes     Types: Marijuana   • Sexual activity: Yes     Birth control/protection: I.U.D.       Family History:   Suicide Attempts: son at age 18 in 2019, self inflicted GSW  Suicide Completions:son at age 18 in 2019, self inflicted GSW      Family History   Problem Relation Age of Onset   • Depression Mother    • Hyperlipidemia Mother    • Depression Father    • Alcohol abuse Father    • Hyperlipidemia Father    • Depression  Maternal Grandfather    • Anxiety disorder Maternal Grandfather    • Depression Maternal Grandmother    • Self-Injurious Behavior  Son    • Suicide Attempts Son    • Depression Son    • Suicidality Son        Developmental History:   Born: [place]  Siblings:no  Childhood: Denies Abuse  High School:Completed  College:[employer]    Mental Status Exam:   Hygiene:   good  Cooperation:  Cooperative  Eye Contact:  Good  Psychomotor Behavior:  Appropriate  Affect:  Appropriate  Mood: depressed and anxious  Speech:  Normal  Thought Process:  Goal directed  Thought Content:  Mood congruent  Suicidal:  None  Homicidal:  None  Hallucinations:  None  Delusion:  None  Memory:  Intact  Orientation:  Person, Place, Time and Situation  Reliability:  good  Insight:  Good  Judgement:  Good  Impulse Control:  Good  Physical/Medical Issues:  Yes HLD,Hypothyroidism,GERD,Acne, Low Vit D; Hashimotos dx in 2018     Review of Systems:  Review of Systems   Constitutional: Positive for fatigue. Negative for diaphoresis.   HENT: Negative for drooling.    Eyes: Negative for visual disturbance.   Respiratory: Negative for cough and shortness of breath.    Cardiovascular: Negative for chest pain, palpitations and leg swelling.   Gastrointestinal: Negative for nausea and vomiting.   Endocrine: Positive for heat intolerance. Negative for cold intolerance.   Genitourinary: Negative for difficulty urinating.   Musculoskeletal: Negative for joint swelling.   Allergic/Immunologic: Negative for immunocompromised state.   Neurological: Negative for dizziness, seizures, speech difficulty and numbness.   Psychiatric/Behavioral: Positive for decreased concentration and sleep disturbance. Negative for hallucinations, self-injury and suicidal ideas. The patient is nervous/anxious. The patient is not hyperactive.          Physical Exam:  Physical Exam  Psychiatric:         Attention and Perception: Attention and perception normal.         Mood and Affect: Mood is  "anxious and depressed.         Speech: Speech normal.         Behavior: Behavior normal. Behavior is cooperative.         Thought Content: Thought content normal. Thought content does not include suicidal ideation. Thought content does not include suicidal plan.         Cognition and Memory: Cognition and memory normal.         Judgment: Judgment normal.         Vital Signs:   /72   Pulse 76   Ht 170.2 cm (67.01\")   Wt 94.2 kg (207 lb 9.6 oz)   SpO2 99%   BMI 32.51 kg/m²      Lab Results:   Office Visit on 01/28/2022   Component Date Value Ref Range Status   • Diagnosis 01/28/2022 Comment   Final    NEGATIVE FOR INTRAEPITHELIAL LESION OR MALIGNANCY.   • Specimen adequacy: 01/28/2022 Comment   Final    Satisfactory for evaluation.  Endocervical and/or squamous metaplastic  cells (endocervical component) are present.  Areas of partially obscuring inflammatory exudate are present.   • Clinician Provided ICD-10: 01/28/2022 Comment   Final    Z01.419   • Performed by: 01/28/2022 Comment   Final    Misa Ann, Cytotechnologist (ASCP)   • . 01/28/2022 .   Final   • Note: 01/28/2022 Comment   Final    The Pap smear is a screening test designed to aid in the detection of  premalignant and malignant conditions of the uterine cervix.  It is not a  diagnostic procedure and should not be used as the sole means of detecting  cervical cancer.  Both false-positive and false-negative reports do occur.   • Method: 01/28/2022 Comment   Final    This liquid based ThinPrep(R) pap test was screened with the  use of an image guided system.   • HPV Aptima 01/28/2022 Negative  Negative Final    This nucleic acid amplification test detects fourteen high-risk  HPV types (16,18,31,33,35,39,45,51,52,56,58,59,66,68) without  differentiation.   • GARDNERELLA VAGINALIS 01/28/2022 Positive (A) Negative Final   • TRICHOMONAS VAGINALIS 01/28/2022 Negative  Negative Final   • CANDIDA SPECIES 01/28/2022 Negative  Negative Final       EKG " Results:  No orders to display       Imaging Results:  No Images in the past 120 days found..      Assessment & Plan   Diagnoses and all orders for this visit:    1. Major depressive disorder, recurrent episode, moderate (HCC) (Primary)    2. Generalized anxiety disorder    3. Insomnia secondary to depression with anxiety        Visit Diagnoses:    ICD-10-CM ICD-9-CM   1. Major depressive disorder, recurrent episode, moderate (HCC)  F33.1 296.32   2. Generalized anxiety disorder  F41.1 300.02   3. Insomnia secondary to depression with anxiety  F51.05 300.4    F41.8 327.02       PLAN:  1. Safety: No acute safety concerns  2. Therapy: Referral Made Patient to contact provider and set up appointment.  And to contact office if unable to get an appointment scheduled.  Brit Nieto [facility]  Address: [facility] [address] [facility] [address], [place] [address], Phone:  [phone]  or  [phone]   Services offered: Family, couples, and individual therapy for a wide variety of areas, such as mood disorders, personality disorders, psychosis, addiction, anxiety, coping skills, grief, trauma and PTSD, transgender, self-harming, suicidal ideation, and other. Multiple types of therapy offered, including dialectical, trauma focused, and more.  Insurance accepted: [place], [place], [facility] and [facility], [facility], [facility], Medicaid, [employer], Out of Network.  3. Risk Assessment: Risk of self-harm acutely is moderate.  Risk factors include anxiety disorder, mood disorder, family history, access to guns/weapons, alcohol and marijuana use currently, and recent psychosocial stressors (pandemic). Protective factors include, no present SI, no history of suicide attempts or self-harm in the past, healthcare seeking, future orientation, willingness to engage in care.  Risk of self-harm chronically is also moderate, but could be further elevated in the event of treatment noncompliance and/or AODA.  4. Meds: Continue Wellbutrin XL  150 mg by mouth daily in the morning to target depression and anxiety..     Continue Lexapro 20 mg by mouth daily to target anxiety and depression.      Start -Continue as patient has only taken one dose  Trazodone 25 mg by mouth nightly to target insomnia and depression.  Instructed to break 50 mg tablet in half to equal 25 mg.   5. Labs: n/a    Due to recent COVID infection patient is unable to determine of whether mood has improved, has experienced fatigue, exhaustion, brain fog and has not been able to schedule with therapist.  Patient plans to schedule and see therapist prior to next appointment in 6 weeks, however, patient informed if therapist appointment is not scheduled until after appointment with this provider to contact office to reschedule for later date.  Patient plans to retry Trazodone due to COVID and excessive fatigue was fearful of taking due to risk of further sedation.     6/24/22:   Instructed patient to proceed to lab after visit for past due labs per PCP, however, patient has already eaten this morning, and would not be able to return later in the afternoon due to having to go to work after.  Patient employer does not draw blood in the office as that option was discussed.   -MR authorization form signed to give [facility] consent to verify appointment of initial evaluation with Brit Nieto.  Form sent to provider via secure email site sendinc per provider request. Referral for therapy ordered    Continue Wellbutrin  mg by mouth daily in the morning to target depression and anxiety. Discussed all risks, benefits, alternatives, and side effects of Bupropion/Wellbutrin including but not limited to GI upset (N/V/D, constipation), tachycardia, diaphoresis, weight loss, agitation, dizziness, headache, insomnia, tremor, blurred vision, anorexia, HTN, activation of daly or hypomania, CNS stimulation and neuropsychiatric effects, ocular effects, seizure risk, withdrawal syndrome following  abrupt discontinuation, and activation of suicidal ideation and behavior. Patient  educated on the need to practice safe sex while taking this med. Discussed the need for patient to immediately call the office for any new or worsening symptoms, such as worsening depression; feeling nervous or restless; suicidal thoughts or actions; or other changes in mood or behavior, and all other concerns. Patient educated on medication compliance. Patient  verbalized understanding and is agreeable to taking Bupropion/Wellbutrin. Addressed all questions and concerns.   Continue Lexapro 20 mg by mouth daily to target anxiety and depression.  Risks, benefits, alternatives discussed with patient including GI upset, nausea vomiting diarrhea, theoretical decrease of seizure threshold predisposing the patient to a slightly higher seizure risk, headaches, sexual dysfunction, serotonin syndrome, bleeding risk, increased suicidality in patients 24 years and younger.  After discussion of these risks and benefits, the patient voiced understanding and agreed to proceed.  Start Trazodone 25 mg by mouth nightly to target insomnia and depression.  Instructed to break 50 mg tablet in half to equal 25 mg. Risks, benefits, side effects discussed with patient including GI upset, sedation, dizziness/falls risk, grogginess the following day, prolongation of the QTc interval.  After discussion of these risks and benefits, the patient voiced understanding and agreed to proceed.     Patient wishes to remain on current medications and start therapy as it has been helpful in the past.  Patient has had an increase in alcohol and marijuana use since death of son in 2019, to cope with feelings.  Unable to increase dose of Wellbutrin XL due to past intolerance at higher doses, having been on both medications for 15 yrs.  Patient denies withdrawal symptoms, and no history of seizures, however, if alcohol consumption continues to increase will consider tapering  down Wellbutrin XL due to risk of lowering seizure threshold.     Patient screened positive for depression based on a PHQ-9 score of 16 on 6/24/2022. Follow-up recommendations include: Prescribed antidepressant medication treatment, Referral to Social Work and Suicide Risk Assessment performed.      TREATMENT PLAN/GOALS: Continue supportive psychotherapy efforts and medications as indicated. Treatment and medication options discussed during today's visit. Patient acknowledged and verbally consented to continue with current treatment plan and was educated on the importance of compliance with treatment and follow-up appointments.    MEDICATION ISSUES:  LIVIER reviewed as expected.  Discussed medication options and treatment plan of prescribed medication as well as the risks, benefits, and side effects including potential falls, possible impaired driving and metabolic adversities among others. Patient is agreeable to call the office with any worsening of symptoms or onset of side effects. Patient is agreeable to call 911 or go to the nearest ER should he/she begin having SI/HI. No medication side effects or related complaints today.     MEDS ORDERED DURING VISIT:  No orders of the defined types were placed in this encounter.      Return in about 6 weeks (around 9/2/2022) for Next scheduled follow up.         I spent 21 minutes caring for Alysa on this date of service. This time includes time spent by me in the following activities: preparing for the visit, performing a medically appropriate examination and/or evaluation, counseling and educating the patient/family/caregiver, referring and communicating with other health care professionals, documenting information in the medical record and care coordination.      This document has been electronically signed by YESY Carroll  July 22, 2022 10:29 EDT      Part of this note may be an electronic transcription/translation of spoken language to printed text using the  [employer] Dictation System.

## 2022-07-27 RX ORDER — BUPROPION HYDROCHLORIDE 150 MG/1
TABLET ORAL
Qty: 90 TABLET | Refills: 1 | Status: SHIPPED | OUTPATIENT
Start: 2022-07-27 | End: 2023-01-13 | Stop reason: SDUPTHER

## 2022-08-18 ENCOUNTER — TELEPHONE (OUTPATIENT)
Dept: FAMILY MEDICINE CLINIC | Age: 42
End: 2022-08-18

## 2022-08-18 NOTE — TELEPHONE ENCOUNTER
Caller: Alysa Qiu    Relationship to patient: Self    Best call back number: [phone]    Chief complaint: LEFT SIDE PAIN FOR PAST 48 HOURS, 8/18/22 PASSED COVID AND MONKEY POX SCREENING    Type of visit: OFFICE    Requested date: ASAP     If rescheduling, when is the original appointment: 08/31/22     Additional notes:THE PATIENT WOULD LIKE A CALL BACK TO DISCUSS SCHEDULING WITH WITH ANY OTHER AVAILABLE MD SOONER THAN PCP HAS AVAILABLE. SHE WOULD ALSO LIKE TO DISCUSS [facility] POLICY OF NOT SCHEDULING MD TO MD FOR OFFICE VISITS VS SAME DAY APPOINTMENTS.

## 2022-09-02 ENCOUNTER — OFFICE VISIT (OUTPATIENT)
Dept: FAMILY MEDICINE CLINIC | Age: 42
End: 2022-09-02

## 2022-09-02 VITALS
SYSTOLIC BLOOD PRESSURE: 123 MMHG | TEMPERATURE: 97.7 F | HEIGHT: 67 IN | DIASTOLIC BLOOD PRESSURE: 79 MMHG | HEART RATE: 63 BPM | WEIGHT: 205 LBS | BODY MASS INDEX: 32.18 KG/M2

## 2022-09-02 DIAGNOSIS — K21.9 GASTROESOPHAGEAL REFLUX DISEASE WITHOUT ESOPHAGITIS: Primary | ICD-10-CM

## 2022-09-02 DIAGNOSIS — R10.12 LUQ PAIN: ICD-10-CM

## 2022-09-02 PROCEDURE — 99214 OFFICE O/P EST MOD 30 MIN: CPT | Performed by: FAMILY MEDICINE

## 2022-09-02 RX ORDER — PANTOPRAZOLE SODIUM 40 MG/1
40 TABLET, DELAYED RELEASE ORAL DAILY
Qty: 90 TABLET | Refills: 1 | Status: SHIPPED | OUTPATIENT
Start: 2022-09-02 | End: 2023-02-14 | Stop reason: SDUPTHER

## 2022-09-02 RX ORDER — SUCRALFATE 1 G/1
1 TABLET ORAL 2 TIMES DAILY PRN
Qty: 60 TABLET | Refills: 0 | Status: SHIPPED | OUTPATIENT
Start: 2022-09-02

## 2022-09-02 NOTE — PROGRESS NOTES
"Chief Complaint  Abdominal Pain (Left sided \"I think its a gastritis flare up. Jen had several\")    Subjective          Alysa Qiu presents to [facility] today for L side pain.    She has a history of gastritis/GERD.  Her first flare was 4 years.  Ever since, has had flares every 6-12 months.   She was started on Nexium at that time. Her most recent flare started 3 weeks ago.  She felt very bloated and left the pain in the LUQ.  The discomfort moves up into the L chest.  No associated N/V, no changes in BMs, no hematochezia, no melena.    She ran out of Nexium, so she took an old rx of pantoprazole as well as some sucralfate she had at home.  She has never had a scope.  She has been checked for H. pylori in the past and has been negative.  At this point, after a couple of weeks of being on pantoprazole and sucralfate, her sx have resolved.      Current Outpatient Medications:   •  albuterol sulfate  (90 Base) MCG/ACT inhaler, Inhale 2 puffs Every 4 (Four) Hours As Needed for Wheezing., Disp: 18 g, Rfl: 1  •  buPROPion XL (WELLBUTRIN XL) 150 MG 24 hr tablet, TAKE ONE TABLET BY MOUTH DAILY, Disp: 90 tablet, Rfl: 1  •  Cholecalciferol 50 MCG (2000 UT) tablet, Take 2,000 Units by mouth Daily., Disp: , Rfl:   •  escitalopram (LEXAPRO) 20 MG tablet, Take 1 tablet by mouth Daily., Disp: 90 tablet, Rfl: 1  •  levothyroxine (SYNTHROID, LEVOTHROID) 150 MCG tablet, Take 150 mcg by mouth Daily., Disp: , Rfl:   •  pantoprazole (PROTONIX) 40 MG EC tablet, Take 1 tablet by mouth Daily., Disp: 90 tablet, Rfl: 1  •  spironolactone (ALDACTONE) 100 MG tablet, Take 1 tablet by mouth Daily., Disp: , Rfl:   •  sucralfate (CARAFATE) 1 g tablet, Take 1 tablet by mouth 2 (Two) Times a Day As Needed (abdominal pain). On an empty stomach, Disp: 60 tablet, Rfl: 0  •  traZODone (DESYREL) 50 MG tablet, Take 0.5 tablets by mouth Every Night. Indications: Trouble Sleeping, Major Depressive Disorder, " "Disp: 15 tablet, Rfl: 1    Allergies:  Tamiflu [oseltamivir]      Objective   Vital Signs:   Vitals:    09/02/22 0820   BP: 123/79   BP Location: Left arm   Patient Position: Sitting   Pulse: 63   Temp: 97.7 °F (36.5 °C)   TempSrc: Oral   Weight: 93 kg (205 lb)   Height: 170.2 cm (67.01\")       Physical Exam  Vitals reviewed.   Constitutional:       General: She is not in acute distress.     Appearance: Normal appearance. She is well-developed.   HENT:      Head: Normocephalic and atraumatic.      Right Ear: External ear normal.      Left Ear: External ear normal.   Eyes:      Extraocular Movements: Extraocular movements intact.      Conjunctiva/sclera: Conjunctivae normal.      Pupils: Pupils are equal, round, and reactive to light.   Cardiovascular:      Rate and Rhythm: Normal rate and regular rhythm.      Heart sounds: No murmur heard.  Pulmonary:      Effort: Pulmonary effort is normal.      Breath sounds: Normal breath sounds. No wheezing, rhonchi or rales.   Abdominal:      General: Bowel sounds are normal. There is no distension.      Palpations: Abdomen is soft. There is no mass.      Tenderness: There is abdominal tenderness (mild, LUQ). There is no guarding or rebound.   Musculoskeletal:         General: Normal range of motion.   Neurological:      Mental Status: She is alert.   Psychiatric:         Mood and Affect: Affect normal.          Lab Results   Component Value Date    GLUCOSE 113 (H) 08/18/2021    BUN 6 08/18/2021    CREATININE 0.90 08/18/2021    EGFRIFNONA 68 08/18/2021    EGFRIFAFRI 83 08/18/2021    BCR 6.7 (L) 08/18/2021    K 4.0 08/18/2021    CO2 27.1 08/18/2021    CALCIUM 10.3 08/18/2021    ALBUMIN 3.90 08/18/2021    LABIL2 1.3 (L) 07/28/2020    AST 17 08/18/2021    ALT 22 08/18/2021       Lab Results   Component Value Date    CHOL 211 (H) 08/18/2021    CHLPL 238 (H) 07/28/2020    TRIG 162 (H) 08/18/2021    HDL 31 (L) 08/18/2021     (H) 08/18/2021            Assessment and Plan  "   Diagnoses and all orders for this visit:    1. Gastroesophageal reflux disease without esophagitis (Primary)  Assessment & Plan:  Periodic flares of LUQ pain, attributed to gastritis (she was diagnosed with this 4 years ago after a trip to the ED with similar sx).  She has been on Nexium since that time, but 3 weeks ago developed a flare despite use of the PPI. She switched to an old rx of pantoprazole that she had left over as well as some sucralfate twice daily.  It is fine to continue this regimen for now, and I have sent in rxs for her, but I do think she would benefit from some additional work-up with a scope given that she has never really had a true eval for the gastritis.  Referral placed to Dr. Gaxiola for further work-up.  She reports negative testing for H. Pylori in the past, but this was not repeated today due to being on the PPI.    Orders:  -     Ambulatory Referral to Gastroenterology  -     pantoprazole (PROTONIX) 40 MG EC tablet; Take 1 tablet by mouth Daily.  Dispense: 90 tablet; Refill: 1  -     sucralfate (CARAFATE) 1 g tablet; Take 1 tablet by mouth 2 (Two) Times a Day As Needed (abdominal pain). On an empty stomach  Dispense: 60 tablet; Refill: 0    2. LUQ pain      Follow Up   Return in about 4 weeks (around 9/30/2022) for Recheck.  Patient was given instructions and counseling regarding her condition or for health maintenance advice. Please see specific information pulled into the AVS if appropriate.

## 2022-09-02 NOTE — ASSESSMENT & PLAN NOTE
Periodic flares of LUQ pain, attributed to gastritis (she was diagnosed with this 4 years ago after a trip to the ED with similar sx).  She has been on Nexium since that time, but 3 weeks ago developed a flare despite use of the PPI. She switched to an old rx of pantoprazole that she had left over as well as some sucralfate twice daily.  It is fine to continue this regimen for now, and I have sent in rxs for her, but I do think she would benefit from some additional work-up with a scope given that she has never really had a true eval for the gastritis.  Referral placed to Dr. Gaxiola for further work-up.  She reports negative testing for H. Pylori in the past, but this was not repeated today due to being on the PPI.

## 2022-09-28 ENCOUNTER — CLINICAL SUPPORT (OUTPATIENT)
Dept: GASTROENTEROLOGY | Facility: CLINIC | Age: 42
End: 2022-09-28

## 2022-09-28 ENCOUNTER — PREP FOR SURGERY (OUTPATIENT)
Dept: OTHER | Facility: HOSPITAL | Age: 42
End: 2022-09-28

## 2022-09-28 DIAGNOSIS — K21.9 GASTROESOPHAGEAL REFLUX DISEASE, UNSPECIFIED WHETHER ESOPHAGITIS PRESENT: Primary | ICD-10-CM

## 2022-09-28 NOTE — PROGRESS NOTES
Alysa Qiu  REASON FOR CALL Screening for EGD   SENT IN PREP none needed   Past Medical History:   Diagnosis Date   • Acne vulgaris    • Anxiety    • Chronic pain disorder    • GERD without esophagitis    • Hyperlipidemia    • Hypothyroidism    • Major depressive disorder    • PTSD (post-traumatic stress disorder)    • Substance abuse (HCC)    • Vitamin B12 deficiency anemia due to intrinsic factor deficiency    • Vitamin D deficiency      Allergies   Allergen Reactions   • Tamiflu [Oseltamivir] Mental Status Change     Mental Fog     Past Surgical History:   Procedure Laterality Date   • APPENDECTOMY     •  SECTION      x2     Social History     Socioeconomic History   • Marital status:    Tobacco Use   • Smoking status: Current Every Day Smoker     Packs/day: 0.25     Types: Cigarettes   • Smokeless tobacco: Never Used   • Tobacco comment: 1-5 cigs per day   Vaping Use   • Vaping Use: Every day   • Substances: Nicotine, THC, CBD, Flavoring   Substance and Sexual Activity   • Alcohol use: Yes     Comment: greater than 4 days a week   • Drug use: Yes     Types: Marijuana   • Sexual activity: Yes     Birth control/protection: I.U.D.     Family History   Problem Relation Age of Onset   • Depression Mother    • Hyperlipidemia Mother    • Depression Father    • Alcohol abuse Father    • Hyperlipidemia Father    • Heart attack Maternal Uncle    • Depression Maternal Grandmother    • Depression Maternal Grandfather    • Anxiety disorder Maternal Grandfather    • Self-Injurious Behavior  Son    • Suicide Attempts Son    • Depression Son    • Suicidality Son        Current Outpatient Medications:   •  albuterol sulfate  (90 Base) MCG/ACT inhaler, Inhale 2 puffs Every 4 (Four) Hours As Needed for Wheezing., Disp: 18 g, Rfl: 1  •  buPROPion XL (WELLBUTRIN XL) 150 MG 24 hr tablet, TAKE ONE TABLET BY MOUTH DAILY, Disp: 90 tablet, Rfl: 1  •  Cholecalciferol 50 MCG (2000 UT) tablet, Take 2,000 Units  by mouth Daily., Disp: , Rfl:   •  escitalopram (LEXAPRO) 20 MG tablet, Take 1 tablet by mouth Daily., Disp: 90 tablet, Rfl: 1  •  levothyroxine (SYNTHROID, LEVOTHROID) 150 MCG tablet, Take 150 mcg by mouth Daily., Disp: , Rfl:   •  pantoprazole (PROTONIX) 40 MG EC tablet, Take 1 tablet by mouth Daily., Disp: 90 tablet, Rfl: 1  •  spironolactone (ALDACTONE) 100 MG tablet, Take 1 tablet by mouth Daily., Disp: , Rfl:   •  sucralfate (CARAFATE) 1 g tablet, Take 1 tablet by mouth 2 (Two) Times a Day As Needed (abdominal pain). On an empty stomach, Disp: 60 tablet, Rfl: 0  •  traZODone (DESYREL) 50 MG tablet, Take 0.5 tablets by mouth Every Night. Indications: Trouble Sleeping, Major Depressive Disorder, Disp: 15 tablet, Rfl: 1    Answers for HPI/ROS submitted by the patient on 9/26/2022  What is the primary reason for your visit?: Abdominal Pain  Chronicity: chronic  Onset: more than 1 month ago  Onset quality: gradual  Frequency: intermittently  Progression since onset: waxing and waning  Pain location: LLQ, LUQ, epigastric region  Pain - numeric: 8/10  Pain quality: aching, burning, cramping, dull, a sensation of fullness  Radiates to: LLQ, LUQ, epigastric region, left shoulder, right shoulder, chest, back  anorexia: No  belching: Yes  constipation: Yes  flatus: Yes  Aggravated by: certain positions, drinking alcohol, eating  Relieved by: belching, bowel movements, passing flatus

## 2022-10-11 ENCOUNTER — TELEPHONE (OUTPATIENT)
Dept: PSYCHIATRY | Facility: CLINIC | Age: 42
End: 2022-10-11

## 2022-10-11 NOTE — TELEPHONE ENCOUNTER
confirmed   Jonas Frias MD  Interventional Cardiology / Endovascular Specialist  [place] Office : [phone] [place] [address] [place] [address]  Tel:   [place] Office : [phone] [facility] [place] [address]  Tel: [phone]  Cell : [phone]    Subjective/Overnight events: Patient lying in bed comfortably. No acute distress.   	  MEDICATIONS:  buMETAnide 2 milliGRAM(s) Oral daily  heparin   Injectable 5000 Unit(s) SubCutaneous every 8 hours  hydrALAZINE 25 milliGRAM(s) Oral every 8 hours  metoprolol succinate ER 25 milliGRAM(s) Oral daily      ALBUTerol    90 MICROgram(s) HFA Inhaler 2 Puff(s) Inhalation every 6 hours PRN  budesonide 160 MICROgram(s)/formoterol 4.5 MICROgram(s) Inhaler 2 Puff(s) Inhalation two times a day      pantoprazole    Tablet 40 milliGRAM(s) Oral before breakfast    simvastatin 10 milliGRAM(s) Oral at bedtime    ferrous    sulfate 325 milliGRAM(s) Oral daily  lidocaine   4% Patch 1 Patch Transdermal every 24 hours  oxymetazoline 0.05% Nasal Spray 2 Spray(s) Nasal every 12 hours PRN  sodium chloride 0.65% Nasal 1 Spray(s) Both Nostrils every 8 hours PRN      PAST MEDICAL/SURGICAL HISTORY  PAST MEDICAL & SURGICAL HISTORY:  Atrial fibrillation  S/P Ablation    Pulmonary hypertension    MIGUEL (obstructive sleep apnea)    COPD (chronic obstructive pulmonary disease)  on home O2    CHF (congestive heart failure)    HTN (hypertension)    Gout    Mitral valve replaced    Tricuspid valve replaced    CHF (congestive heart failure)    Hypertension    COPD (chronic obstructive pulmonary disease)    History of mitral valve replacement with mechanical valve    Tricuspid valve replaced  mechanical    No significant past surgical history        SOCIAL HISTORY: Substance Use (street drugs): ( x ) never used  (  ) other:    FAMILY HISTORY:  Family history of hypertension (Father, Sibling)    Family history of stroke    Family history of hypertension (Mother)        REVIEW OF SYSTEMS:  CONSTITUTIONAL: No fever, weight loss, or fatigue  EYES: No eye pain, visual disturbances, or discharge  ENMT:  No difficulty hearing, tinnitus, vertigo; No sinus or throat pain  BREASTS: No pain, masses, or nipple discharge  GASTROINTESTINAL: No abdominal or epigastric pain. No nausea, vomiting, or hematemesis; No diarrhea or constipation. No melena or hematochezia.  GENITOURINARY: No dysuria, frequency, hematuria, or incontinence  NEUROLOGICAL: No headaches, memory loss, loss of strength, numbness, or tremors  ENDOCRINE: No heat or cold intolerance; No hair loss  MUSCULOSKELETAL: No joint pain or swelling; No muscle, back, or extremity pain  PSYCHIATRIC: No depression, anxiety, mood swings, or difficulty sleeping  HEME/LYMPH: No easy bruising, or bleeding gums  All others negative    PHYSICAL EXAM:  T(C): 36.6 (12-28-21 @ 21:05), Max: 36.6 (12-28-21 @ 04:55)  HR: 68 (12-28-21 @ 21:05) (68 - 110)  BP: 147/71 (12-28-21 @ 21:05) (125/74 - 147/71)  RR: 19 (12-28-21 @ 21:05) (18 - 20)  SpO2: 99% (12-28-21 @ 21:05) (95% - 99%)  Wt(kg): --  I&O's Summary    27 Dec 2021 07:01  -  28 Dec 2021 07:00  --------------------------------------------------------  IN: 0 mL / OUT: 150 mL / NET: -150 mL    28 Dec 2021 07:01  -  28 Dec 2021 23:29  --------------------------------------------------------  IN: 0 mL / OUT: 550 mL / NET: -550 mL      EYES:   PERRLA   ENMT:   Moist mucous membranes, Good dentition, No lesions  Cardiovascular: Normal S1 S2, No JVD, No murmurs, No edema  Respiratory: b/l rhonchi   Gastrointestinal:  Soft, Non-tender, + BS	  Extremities: 1+ edema                              8.8    5.64  )-----------( 349      ( 28 Dec 2021 06:39 )             30.7     12-28    135  |  95<L>  |  62<H>  ----------------------------<  100<H>  4.4   |  35<H>  |  1.75<H>    Ca    10.2      28 Dec 2021 06:39  Phos  3.2     12-28  Mg     2.50     12-28      proBNP:   Lipid Profile:   HgA1c:   TSH:     Consultant(s) Notes Reviewed:  [x ] YES  [ ] NO    Care Discussed with Consultants/Other Providers [ x] YES  [ ] NO    Imaging Personally Reviewed independently:  [x] YES  [ ] NO    All labs, radiologic studies, vitals, orders and medications list reviewed. Patient is seen and examined at bedside. Case discussed with medical team.

## 2022-10-11 NOTE — TELEPHONE ENCOUNTER
PATIENT WAS REFERRED OUT TO JOSHUA [facility]CIAL ON 06/24/2022. PATIENT STATES SHE HASN'T FOUND THE TIME TO SCHEDULE APPT. STILL WANTS TO JUST DON'T KNOW WHEN.  PATIENT STATES TO CLOSE OUT REFERRAL UNTIL SHE HAS TIME. CLOSING OUT REFERRAL.

## 2022-12-07 ENCOUNTER — TRANSCRIBE ORDERS (OUTPATIENT)
Dept: ADMINISTRATIVE | Facility: HOSPITAL | Age: 42
End: 2022-12-07

## 2022-12-07 ENCOUNTER — LAB (OUTPATIENT)
Dept: LAB | Facility: HOSPITAL | Age: 42
End: 2022-12-07

## 2022-12-07 DIAGNOSIS — E06.3 CHRONIC LYMPHOCYTIC THYROIDITIS: ICD-10-CM

## 2022-12-07 DIAGNOSIS — E55.9 VITAMIN D DEFICIENCY: ICD-10-CM

## 2022-12-07 DIAGNOSIS — E55.9 VITAMIN D DEFICIENCY: Primary | ICD-10-CM

## 2022-12-07 LAB
25(OH)D3 SERPL-MCNC: 23.8 NG/ML (ref 30–100)
ALBUMIN SERPL-MCNC: 4 G/DL (ref 3.5–5.2)
ALBUMIN/GLOB SERPL: 1.7 G/DL
ALP SERPL-CCNC: 69 U/L (ref 39–117)
ALT SERPL W P-5'-P-CCNC: 21 U/L (ref 1–33)
ANION GAP SERPL CALCULATED.3IONS-SCNC: 9 MMOL/L (ref 5–15)
AST SERPL-CCNC: 16 U/L (ref 1–32)
BILIRUB SERPL-MCNC: <0.2 MG/DL (ref 0–1.2)
BUN SERPL-MCNC: 7 MG/DL (ref 6–20)
BUN/CREAT SERPL: 7.4 (ref 7–25)
CALCIUM SPEC-SCNC: 10 MG/DL (ref 8.6–10.5)
CHLORIDE SERPL-SCNC: 104 MMOL/L (ref 98–107)
CO2 SERPL-SCNC: 26 MMOL/L (ref 22–29)
CREAT SERPL-MCNC: 0.95 MG/DL (ref 0.57–1)
EGFRCR SERPLBLD CKD-EPI 2021: 75.9 ML/MIN/1.73
GLOBULIN UR ELPH-MCNC: 2.4 GM/DL
GLUCOSE SERPL-MCNC: 100 MG/DL (ref 65–99)
POTASSIUM SERPL-SCNC: 3.9 MMOL/L (ref 3.5–5.2)
PROT SERPL-MCNC: 6.4 G/DL (ref 6–8.5)
SODIUM SERPL-SCNC: 139 MMOL/L (ref 136–145)
T4 FREE SERPL-MCNC: 1.27 NG/DL (ref 0.93–1.7)
TSH SERPL DL<=0.05 MIU/L-ACNC: 1.31 UIU/ML (ref 0.27–4.2)

## 2022-12-07 PROCEDURE — 84439 ASSAY OF FREE THYROXINE: CPT

## 2022-12-07 PROCEDURE — 84443 ASSAY THYROID STIM HORMONE: CPT

## 2022-12-07 PROCEDURE — 80053 COMPREHEN METABOLIC PANEL: CPT

## 2022-12-07 PROCEDURE — 82306 VITAMIN D 25 HYDROXY: CPT

## 2022-12-07 PROCEDURE — 36415 COLL VENOUS BLD VENIPUNCTURE: CPT

## 2022-12-28 RX ORDER — ESCITALOPRAM OXALATE 20 MG/1
20 TABLET ORAL DAILY
Qty: 90 TABLET | Refills: 0 | Status: SHIPPED | OUTPATIENT
Start: 2022-12-28 | End: 2023-01-13 | Stop reason: SDUPTHER

## 2023-01-13 ENCOUNTER — OFFICE VISIT (OUTPATIENT)
Dept: FAMILY MEDICINE CLINIC | Age: 43
End: 2023-01-13
Payer: COMMERCIAL

## 2023-01-13 VITALS
BODY MASS INDEX: 32.52 KG/M2 | DIASTOLIC BLOOD PRESSURE: 59 MMHG | WEIGHT: 207.2 LBS | TEMPERATURE: 97.7 F | HEART RATE: 69 BPM | HEIGHT: 67 IN | SYSTOLIC BLOOD PRESSURE: 114 MMHG

## 2023-01-13 DIAGNOSIS — F41.9 ANXIETY: ICD-10-CM

## 2023-01-13 DIAGNOSIS — F33.1 MAJOR DEPRESSIVE DISORDER, RECURRENT EPISODE, MODERATE DEGREE: Primary | ICD-10-CM

## 2023-01-13 DIAGNOSIS — Z12.31 SCREENING MAMMOGRAM, ENCOUNTER FOR: ICD-10-CM

## 2023-01-13 DIAGNOSIS — E03.9 ACQUIRED HYPOTHYROIDISM: ICD-10-CM

## 2023-01-13 DIAGNOSIS — K21.9 GASTROESOPHAGEAL REFLUX DISEASE WITHOUT ESOPHAGITIS: ICD-10-CM

## 2023-01-13 PROBLEM — R10.12 LUQ PAIN: Status: RESOLVED | Noted: 2022-09-02 | Resolved: 2023-01-13

## 2023-01-13 PROCEDURE — 99214 OFFICE O/P EST MOD 30 MIN: CPT | Performed by: FAMILY MEDICINE

## 2023-01-13 RX ORDER — BUPROPION HYDROCHLORIDE 300 MG/1
300 TABLET ORAL DAILY
Qty: 90 TABLET | Refills: 1 | Status: SHIPPED | OUTPATIENT
Start: 2023-01-13

## 2023-01-13 RX ORDER — ESCITALOPRAM OXALATE 20 MG/1
20 TABLET ORAL DAILY
Qty: 90 TABLET | Refills: 1 | Status: SHIPPED | OUTPATIENT
Start: 2023-01-13

## 2023-01-13 NOTE — ASSESSMENT & PLAN NOTE
Continue on escitalopram and bupropion for now.  We are going to increase her Wellbutrin from 150 mg daily to 300 mg daily.  She is interested in getting in with Dr. Jose Bettencourt over in [facility] to discuss some of the regimens that she has seen online.  In the meantime, I do strongly feel that she would benefit from psychotherapy.  Name and number given for Jamel Sánchez here in [facility].

## 2023-01-13 NOTE — PROGRESS NOTES
Chief Complaint  Depression (6 month follow up)    Subjective          Alysa Qiu presents to [facility] today for routine follow-up on chronic issues.    She is on bupropion and escitalopram for depression and anxiety.  Has seen Joaquina Chacon but did cancel her last appointment because she just did not feel that it was helping.  She is interested in hearing about more experimental regimens.  She is also interested in getting in for psychotherapy but has not yet worked up the energy to do so.    She is on levothyroxine for hypothyroidism.  Following with Dr. Dave Lord.  He manages these meds for her.    She is on spironolactone for acne.  That works pretty well for her.     She is on pantoprazole for GERD.  Also taking sucralfate twice daily as needed.      Current Outpatient Medications:   •  albuterol sulfate  (90 Base) MCG/ACT inhaler, Inhale 2 puffs Every 4 (Four) Hours As Needed for Wheezing., Disp: 18 g, Rfl: 1  •  buPROPion XL (WELLBUTRIN XL) 300 MG 24 hr tablet, Take 1 tablet by mouth Daily., Disp: 90 tablet, Rfl: 1  •  Cholecalciferol 50 MCG (2000 UT) tablet, Take 2,000 Units by mouth Daily., Disp: , Rfl:   •  escitalopram (LEXAPRO) 20 MG tablet, Take 1 tablet by mouth Daily., Disp: 90 tablet, Rfl: 1  •  levothyroxine (SYNTHROID, LEVOTHROID) 150 MCG tablet, Take 150 mcg by mouth Daily., Disp: , Rfl:   •  pantoprazole (PROTONIX) 40 MG EC tablet, Take 1 tablet by mouth Daily., Disp: 90 tablet, Rfl: 1  •  spironolactone (ALDACTONE) 100 MG tablet, Take 1 tablet by mouth Daily., Disp: , Rfl:   •  sucralfate (CARAFATE) 1 g tablet, Take 1 tablet by mouth 2 (Two) Times a Day As Needed (abdominal pain). On an empty stomach, Disp: 60 tablet, Rfl: 0  •  traZODone (DESYREL) 50 MG tablet, Take 0.5 tablets by mouth Every Night. Indications: Trouble Sleeping, Major Depressive Disorder, Disp: 15 tablet, Rfl: 1    Allergies:  Tamiflu [oseltamivir]      Objective   Vital  "Signs:   Vitals:    01/13/23 0821   BP: 114/59   BP Location: Right arm   Patient Position: Sitting   Pulse: 69   Temp: 97.7 °F (36.5 °C)   TempSrc: Oral   Weight: 94 kg (207 lb 3.2 oz)   Height: 170.2 cm (67.01\")       Physical Exam  Vitals reviewed.   Constitutional:       General: She is not in acute distress.     Appearance: Normal appearance. She is well-developed.   HENT:      Head: Normocephalic and atraumatic.      Right Ear: External ear normal.      Left Ear: External ear normal.   Eyes:      Extraocular Movements: Extraocular movements intact.      Conjunctiva/sclera: Conjunctivae normal.      Pupils: Pupils are equal, round, and reactive to light.   Cardiovascular:      Rate and Rhythm: Normal rate and regular rhythm.      Heart sounds: No murmur heard.  Pulmonary:      Effort: Pulmonary effort is normal.      Breath sounds: Normal breath sounds. No wheezing, rhonchi or rales.   Abdominal:      General: Bowel sounds are normal. There is no distension.      Palpations: Abdomen is soft.      Tenderness: There is no abdominal tenderness.   Musculoskeletal:         General: Normal range of motion.   Neurological:      Mental Status: She is alert.   Psychiatric:         Mood and Affect: Affect normal.             Assessment and Plan    Diagnoses and all orders for this visit:    1. Major depressive disorder, recurrent episode, moderate degree (HCC) (Primary)  Assessment & Plan:  Continue on escitalopram and bupropion for now.  We are going to increase her Wellbutrin from 150 mg daily to 300 mg daily.  She is interested in getting in with Dr. Jose Bettencourt over in [facility] to discuss some of the regimens that she has seen online.  In the meantime, I do strongly feel that she would benefit from psychotherapy.  Name and number given for Jamel Sánchez here in [facility].    Orders:  -     Ambulatory Referral to Behavioral Health  -     buPROPion XL (WELLBUTRIN XL) 300 MG 24 hr tablet; Take 1 tablet by mouth Daily.  " Dispense: 90 tablet; Refill: 1  -     escitalopram (LEXAPRO) 20 MG tablet; Take 1 tablet by mouth Daily.  Dispense: 90 tablet; Refill: 1    2. Anxiety  Assessment & Plan:  As per depression plan.    Orders:  -     Ambulatory Referral to Behavioral Health    3. Acquired hypothyroidism  Assessment & Plan:  Stable on levothyroxine 150 mcg daily.  Following with Dr. Dave Lord.  Labs are reviewed and up-to-date.      4. Gastroesophageal reflux disease without esophagitis  Assessment & Plan:  Stable on pantoprazole 40 mg daily and occasionally still uses sucralfate.  Continue to monitor.  Wean as able.      5. Screening mammogram, encounter for  -     Mammo Screening Digital Tomosynthesis Bilateral With CAD; Future      Follow Up   Return in about 3 months (around 4/13/2023).  Patient was given instructions and counseling regarding her condition or for health maintenance advice. Please see specific information pulled into the AVS if appropriate.

## 2023-01-13 NOTE — ASSESSMENT & PLAN NOTE
Stable on pantoprazole 40 mg daily and occasionally still uses sucralfate.  Continue to monitor.  Wean as able.

## 2023-01-13 NOTE — ASSESSMENT & PLAN NOTE
Stable on levothyroxine 150 mcg daily.  Following with Dr. Dave Lord.  Labs are reviewed and up-to-date.

## 2023-01-27 ENCOUNTER — OFFICE VISIT (OUTPATIENT)
Dept: PSYCHIATRY | Facility: CLINIC | Age: 43
End: 2023-01-27
Payer: COMMERCIAL

## 2023-01-27 VITALS
BODY MASS INDEX: 32.9 KG/M2 | HEART RATE: 67 BPM | HEIGHT: 67 IN | DIASTOLIC BLOOD PRESSURE: 68 MMHG | SYSTOLIC BLOOD PRESSURE: 127 MMHG | WEIGHT: 209.6 LBS

## 2023-01-27 DIAGNOSIS — F10.11 HISTORY OF ALCOHOL ABUSE: ICD-10-CM

## 2023-01-27 DIAGNOSIS — F12.90 MARIJUANA USE: ICD-10-CM

## 2023-01-27 DIAGNOSIS — F33.1 MAJOR DEPRESSIVE DISORDER, RECURRENT EPISODE, MODERATE DEGREE: Primary | ICD-10-CM

## 2023-01-27 DIAGNOSIS — F41.1 GAD (GENERALIZED ANXIETY DISORDER): ICD-10-CM

## 2023-01-27 PROCEDURE — 99214 OFFICE O/P EST MOD 30 MIN: CPT | Performed by: PSYCHIATRY & NEUROLOGY

## 2023-01-27 PROCEDURE — 90838 PSYTX W PT W E/M 60 MIN: CPT | Performed by: PSYCHIATRY & NEUROLOGY

## 2023-01-27 RX ORDER — ARIPIPRAZOLE 2 MG/1
2 TABLET ORAL DAILY
Qty: 30 TABLET | Refills: 0 | Status: SHIPPED | OUTPATIENT
Start: 2023-01-27

## 2023-01-27 NOTE — TREATMENT PLAN
Multi-Disciplinary Problems (from Behavioral Health Treatment Plan)    Active Problems     Problem: Anxiety  Start Date: 01/27/23    Problem Details: The patient self-scales this problem as a 7 with 10 being the worst.        Goal Priority Start Date Expected End Date End Date    Patient will develop and implement behavioral and cognitive strategies to reduce anxiety and irrational fears. -- 01/27/23 -- --    Goal Details: Progress toward goal:  Not appropriate to rate progress toward goal since this is the initial treatment plan.        Goal Intervention Frequency Start Date End Date    Help patient explore past emotional issues in relation to present anxiety. PRN 01/27/23 --    Intervention Details: Duration of treatment until until remission of symptoms.        Goal Intervention Frequency Start Date End Date    Help patient develop an awareness of their cognitive and physical responses to anxiety. PRN 01/27/23 --    Intervention Details: Duration of treatment until until remission of symptoms.              Problem: Depression  Start Date: 01/27/23    Problem Details: The patient self-scales this problem as a 7 with 10 being the worst.        Goal Priority Start Date Expected End Date End Date    Patient will demonstrate the ability to initiate new constructive life skills outside of sessions on a consistent basis. -- 01/27/23 -- --    Goal Details: Progress toward goal:  Not appropriate to rate progress toward goal since this is the initial treatment plan.        Goal Intervention Frequency Start Date End Date    Assist patient in setting attainable activities of daily living goals. PRN 01/27/23 --    Goal Intervention Frequency Start Date End Date    Provide education about depression PRN 01/27/23 --    Intervention Details: Duration of treatment until until remission of symptoms.        Goal Intervention Frequency Start Date End Date    Assist patient in developing healthy coping strategies. PRN 01/27/23 --     Intervention Details: Duration of treatment until until remission of symptoms.                           I have discussed and reviewed this treatment plan with the patient.

## 2023-01-27 NOTE — PROGRESS NOTES
"[facility]  Follow-up Evaluation, Transition in Provider    Referring Provider:  Ann Melvin MD  [address]  [address]  [place] [address]    Chief Complaint: \"Depression with a little relief from the medicines I've tried\"    History of Present Illness: Alysa Qiu is a 42 y.o. female who presents today for follow-up (transition from [facility]) regarding depression. Notes she felt Joaquina wasn't open to patient suggestions, namely with regard to newer psychotropics like ketamine and psilocybin. She presents unaccompanied in no acute distress and engages with me appropriately. Psychotropic regimen with which patient presents is described as partially effective.     Patient remains feeling depressed and anxious, but does feel like her escitalopram has been helpful in general. She isn't sure how she feels about bupropion; this was just titrated. She's amenable to give it more time. Divorce 2016, loss of son in 2019, familial stressors have been conducive to enduring depressive symptoms. Is romantically involved with someone whom she sees to be a good influence. Patient feels as if her life is in a sad state compared to the past, feels ashamed about the way she's feeling and living. Felt numbed by alcohol and THC for some time, then after cutting back on these feels emotions are somewhat flooding her again. In her marriage of 15 years she was made to feel inadequate and poorly about herself, this schema has endured. Patient is engaging with a therapist in short time and is interested in referral for ketamine therapy today. Her daughter will soon attend college and the patient fears this because she's not sure how her daughter will interface with the same stresses her son did before he took his own life in 2019.    I have counseled the patient with regard to diagnoses and the recommended treatment regimen as documented below: I will assume prescriptive " responsibility for bupropion and escitalopram. I will be prescribing aripiprazole for mood augmentation. Patient has been advised this agent has propensity to contribute to EPS (particularly dystonia, tremor, akathisia) and - in rare cases - has contributed to development of pathological gambling habits. Patient acknowledges the diagnoses per my rendered interpretation. Patient demonstrates awareness/understanding of viable alternatives for treatment as well as potential risks, benefits, and side effects associated with this regimen and is amenable to proceed in this fashion.     Recommended lifestyle changes: reduce caffeine intake to 24 oz of soda daily and replace with water, begin taking brisk 30 minute walks 3 days weekly.    Psychotherapy  - Time: 60 minutes  - interventions employed: the therapeutic alliance was strengthened to encourage the patient to express their thoughts and feelings freely. Esteem building was enhanced through praise, reassurance, normalizing/challenging, and encouragement as appropriate. Coping skills were enhanced to build distress tolerance skills and emotional regulation. Allowed patient to freely discuss issues without interruption or judgement with unconditional positive regard, active listening skills, and empathy. Provided a safe, confidential environment to facilitate the development of a positive therapeutic relationship and encourage open, honest communication. Assisted patient in identifying risk factors which would indicate the need for higher level of care including thoughts to harm self or others and/or self-harming behavior. Assisted patient in processing session content; acknowledged and normalized/addressed, as appropriate, patient’s thoughts, feelings, and concerns by utilizing a person-centered approach in efforts to build appropriate rapport and a positive therapeutic relationship with open and honest communication.   - Diagnoses: see assessment and plan below  -  "Symptoms: see subjective above  - Goals   - patient: \"enjoy life... not be worried all the time, sad all the time\"; felt she had numbed herself to emotions for some time with alcohol and THC, but since cutting back on these things that emotions are coming back and she's feeling them very strongly   - provider: challenge patterns of living conducive to pathology, strengthen defenses, promote problems solving, restore adaptive functioning and provide symptom relief.  - Treatment plan: continue supportive psychotherapy in subsequent appointments to provide symptom relief; see assessment and plan below for additional details:   - iteration: 1   - progress: minimal   - (X)illumination, (X)contextualization, (X)detection, (-)development, (-)elaboration, (-)refinement  - functional status: impaired  - mental status exam: as below  - prognosis: good    Psychiatric History:  Diagnoses: postpartum > depression  Outpatient history: yes, around a year and a half ago  Inpatient history: denies  Medication trials: has tried others, can't recall - \"probably all of them in the early 2000's\"; hasn't tried antipsychotics  Other treatment modalities: considering EMDR with Jamel Sánchez in [place]  Presenting regimen: escitalopram 20 mg QD, bupropion  mg QD  Self harm: denies  Suicide attempts: denies    Substance Abuse History:   Types/methods/frequency: drinks alcohol less than once daily on average; uses THC twice monthly    Social History:  Residence: lives in an apartment with her daughter 17 YO; son, Tam, passed away at 18 years old (2019),  ~2016  Vocation: works two jobs;  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, drives for [employer]  Education: cosmetology certificate  Pertinent developmental history: denies; no abuse hx  Pertinent legal history: denies  Hobbies/interests: used to enjoy hiking and being on the water, swimming  Oriental orthodox: \"I guess it depends on the day\", feels angry with God, " "Mandaen by history  Exercise: denies  Dietary habits: defer  Sleep hygiene: defer  Social habits: no pertinent issues  Sunlight: no concern for under-exposure  Caffeine intake: \"non-stop\"  Hydration habits: denies   history: denies    Social History     Socioeconomic History   • Marital status:    Tobacco Use   • Smoking status: Some Days     Packs/day: 0.25     Years: 15.00     Pack years: 3.75     Types: Cigarettes, Electronic Cigarette     Last attempt to quit: 2021     Years since quittin.1   • Smokeless tobacco: Never   • Tobacco comments:     1-5 cigs per day   Vaping Use   • Vaping Use: Every day   • Substances: Nicotine, THC, CBD, Flavoring   Substance and Sexual Activity   • Alcohol use: Yes     Alcohol/week: 8.0 standard drinks     Types: 8 Cans of beer per week     Comment: greater than 4 days a week   • Drug use: Yes     Types: Marijuana   • Sexual activity: Yes     Partners: Male     Birth control/protection: I.U.D.     Comment: IUD needs to be replaced possibly     Access to Firearms: yes    Tobacco use counseling/intervention: patient was counseled with regard to risks of tobacco use and encouraged to consider quitting, with or without the use of adjunctive medication, by first setting a prospective quit date. Currently precontemplative by transtheoretical model.     PHQ-9 Depression Screening  PHQ-9 Total Score: 20    Little interest or pleasure in doing things? 2-->more than half the days   Feeling down, depressed, or hopeless? 3-->nearly every day   Trouble falling or staying asleep, or sleeping too much? 3-->nearly every day   Feeling tired or having little energy? 3-->nearly every day   Poor appetite or overeating? 2-->more than half the days   Feeling bad about yourself - or that you are a failure or have let yourself or your family down? 3-->nearly every day   Trouble concentrating on things, such as reading the newspaper or watching television? 3-->nearly every day "   Moving or speaking so slowly that other people could have noticed? Or the opposite - being so fidgety or restless that you have been moving around a lot more than usual? 1-->several days   Thoughts that you would be better off dead, or of hurting yourself in some way? 0-->not at all   PHQ-9 Total Score 20     JEANNETTE-7  Feeling nervous, anxious or on edge: Several days  Not being able to stop or control worrying: Nearly every day  Worrying too much about different things: Nearly every day  Trouble Relaxing: Nearly every day  Being so restless that it is hard to sit still: Several days  Becoming easily annoyed or irritable: Nearly every day  JEANNETTE 7 Total Score: 14  If you checked any problems, how difficult have these problems made it for you to do your work, take care of things at home, or get along with other people: Very difficult    Problem List:  Patient Active Problem List   Diagnosis   • History of COVID-19   • Acquired hypothyroidism   • HLA B27 (HLA B27 positive)   • JEANNETTE (generalized anxiety disorder)   • Major depressive disorder, recurrent episode, moderate degree (HCC)   • Mixed hyperlipidemia   • Vitamin D deficiency   • Gastroesophageal reflux disease without esophagitis   • Marijuana use   • History of alcohol abuse     Allergy:   Allergies   Allergen Reactions   • Tamiflu [Oseltamivir] Mental Status Change     Mental Fog      Discontinued Medications:  There are no discontinued medications.    Current Medications:   Current Outpatient Medications   Medication Sig Dispense Refill   • albuterol sulfate  (90 Base) MCG/ACT inhaler Inhale 2 puffs Every 4 (Four) Hours As Needed for Wheezing. 18 g 1   • buPROPion XL (WELLBUTRIN XL) 300 MG 24 hr tablet Take 1 tablet by mouth Daily. 90 tablet 1   • escitalopram (LEXAPRO) 20 MG tablet Take 1 tablet by mouth Daily. 90 tablet 1   • levothyroxine (SYNTHROID, LEVOTHROID) 150 MCG tablet Take 150 mcg by mouth Daily.     • pantoprazole (PROTONIX) 40 MG EC tablet  Take 1 tablet by mouth Daily. 90 tablet 1   • spironolactone (ALDACTONE) 100 MG tablet Take 1 tablet by mouth Daily.     • sucralfate (CARAFATE) 1 g tablet Take 1 tablet by mouth 2 (Two) Times a Day As Needed (abdominal pain). On an empty stomach 60 tablet 0   • traZODone (DESYREL) 50 MG tablet Take 0.5 tablets by mouth Every Night. Indications: Trouble Sleeping, Major Depressive Disorder 15 tablet 1   • ARIPiprazole (ABILIFY) 2 MG tablet Take 1 tablet by mouth Daily. 30 tablet 0   • Cholecalciferol 50 MCG (2000 UT) tablet Take 2,000 Units by mouth Daily.       No current facility-administered medications for this visit.     Past Medical History:  Past Medical History:   Diagnosis Date   • Acne vulgaris    • Anxiety    • Chronic pain disorder    • GERD without esophagitis    • Hyperlipidemia    • Hypothyroidism    • Major depressive disorder    • PTSD (post-traumatic stress disorder)    • Substance abuse (HCC)    • Vitamin B12 deficiency anemia due to intrinsic factor deficiency    • Vitamin D deficiency      Past Surgical History:  Past Surgical History:   Procedure Laterality Date   • APPENDECTOMY     •  SECTION      x2   • SKIN BIOPSY       Family History:   Family History   Problem Relation Age of Onset   • Depression Mother    • Hyperlipidemia Mother    • Stroke Mother    • Thyroid disease Mother    • Depression Father    • Alcohol abuse Father    • Hyperlipidemia Father    • Diabetes Father    • Heart attack Maternal Uncle    • Depression Maternal Grandmother    • Depression Maternal Grandfather    • Anxiety disorder Maternal Grandfather    • Self-Injurious Behavior  Son    • Suicide Attempts Son    • Depression Son    • Suicidality Son      Mental Status Exam:   Appearance: well-groomed, sits upright, age-appropriate, above average habitus  Behavior: calm, cooperative, appropriate in demeanor, appropriate eye-contact  Mood/affect: dysphoric / mood-congruent, but appropriate in both range and  "amplitude  Speech: within expected variance; appropriate rate, appropriate rhythm, appropriate tone; non-pressured  Thought Process: linear, goal-directed; no FOI or MUNIRA; abstraction intact  Thought Content: coherent, devoid of overt delusions/perceptual disturbances  SI/HI: denies both SI and HI; exhibits future-orientation, self-advocates appropriately, no regular self-harm, no appreciable intent  Memory: no overt deficits  Orientation: oriented to person/place/time/situation  Concentration: distractible  Intellectual capacity: presumptively average  Insight: fair by given history/exam  Judgment: appropriate by given history/exam  Psychomotor: no appreciable latency/retardation/agitation/tremor  Gait: WNL    Review of Systems:  Review of Systems   Constitutional: Negative for activity change, appetite change and unexpected weight change.   HENT: Negative for drooling.    Eyes: Negative for visual disturbance.   Respiratory: Negative for chest tightness and shortness of breath.    Cardiovascular: Negative for chest pain and palpitations.   Gastrointestinal: Negative for abdominal pain, diarrhea and nausea.   Endocrine: Negative for cold intolerance and heat intolerance.   Genitourinary: Negative for difficulty urinating and frequency.   Musculoskeletal: Negative for myalgias and neck stiffness.   Skin: Negative for rash.   Neurological: Negative for dizziness, tremors, seizures and light-headedness.      Vital Signs:   /68   Pulse 67   Ht 170.2 cm (67\")   Wt 95.1 kg (209 lb 9.6 oz)   BMI 32.83 kg/m²      Lab Results:   Lab on 12/07/2022   Component Date Value Ref Range Status   • Glucose 12/07/2022 100 (H)  65 - 99 mg/dL Final   • BUN 12/07/2022 7  6 - 20 mg/dL Final   • Creatinine 12/07/2022 0.95  0.57 - 1.00 mg/dL Final   • Sodium 12/07/2022 139  136 - 145 mmol/L Final   • Potassium 12/07/2022 3.9  3.5 - 5.2 mmol/L Final   • Chloride 12/07/2022 104  98 - 107 mmol/L Final   • CO2 12/07/2022 26.0  22.0 - " 29.0 mmol/L Final   • Calcium 12/07/2022 10.0  8.6 - 10.5 mg/dL Final   • Total Protein 12/07/2022 6.4  6.0 - 8.5 g/dL Final   • Albumin 12/07/2022 4.00  3.50 - 5.20 g/dL Final   • ALT (SGPT) 12/07/2022 21  1 - 33 U/L Final   • AST (SGOT) 12/07/2022 16  1 - 32 U/L Final   • Alkaline Phosphatase 12/07/2022 69  39 - 117 U/L Final   • Total Bilirubin 12/07/2022 <0.2  0.0 - 1.2 mg/dL Final   • Globulin 12/07/2022 2.4  gm/dL Final   • A/G Ratio 12/07/2022 1.7  g/dL Final   • BUN/Creatinine Ratio 12/07/2022 7.4  7.0 - 25.0 Final   • Anion Gap 12/07/2022 9.0  5.0 - 15.0 mmol/L Final   • eGFR 12/07/2022 75.9  >60.0 mL/min/1.73 Final    National Kidney Foundation and American Society of Nephrology (ASN) Task Force recommended calculation based on the Chronic Kidney Disease Epidemiology Collaboration (CKD-EPI) equation refit without adjustment for race.   • TSH 12/07/2022 1.310  0.270 - 4.200 uIU/mL Final   • Free T4 12/07/2022 1.27  0.93 - 1.70 ng/dL Final   • 25 Hydroxy, Vitamin D 12/07/2022 23.8 (L)  30.0 - 100.0 ng/ml Final     EKG Results:  No orders to display     Imaging Results:  No Images in the past 120 days found.    ASSESSMENT AND PLAN:    ICD-10-CM ICD-9-CM   1. Major depressive disorder, recurrent episode, moderate degree (HCC)  F33.1 296.32   2. JEANNETTE (generalized anxiety disorder)  F41.1 300.02   3. Marijuana use  F12.90 305.20   4. History of alcohol abuse  F10.11 305.03     42 y.o. female who presents today for initial evaluation regarding depression and anxiety. We have discussed the history and interpreted diagnoses as above as well as the treatment plan below, including potential R/B/SE of the recommended regimen of which the patient demonstrates understanding. Patient is agreeable to call 911 or go to the nearest ER should she become concerned for her own safety and/or the safety of those around her. There are no overt indices of acute daly/psychosis on evaluation today. There are no medication side  effects or related complaints today.     1. Medication regimen: begin aripiprazole 2 mg QD, continue bupropion and escitalopram; patient is advised not to misuse prescribed medications or to use any exogenous substances that aren't disclosed to this provider as they may interact with the regimen to her detriment.   2. Risk Assessment: protracted risk is moderate, imminent risk is low.  Risk factors include: anxiety disorder, mood disorder, and recent/ongoing psychosocial stressors. Protective factors include: intact reality testing, no present SI, no stated history of suicide attempts or self-harm, patient's exhibited future-orientation, strong social support, and patient's cooperation with care. Do note that this is subject to change with the Orthodoxy of new stressors, treatment non-adherence, use of substances, and/or new medical ails.  3. Monitoring: reviewed labs as populated above; PHQ-9 today is 20/27, JEANNETTE-7 today is 14/21  4. Therapy: Lasha Sánchez  5. Follow-up: 1 month; referral placed for [facility]  6. Communications: N/A    TREATMENT PLAN/GOALS: challenge patterns of living conducive to symptom burden, implement recommended regimen as above with augmentative, intermittent supportive psychotherapy to reduce symptom burden. Patient acknowledged and verbally consented to begin treatment as above. The importance of adherence to the recommended treatment and interval follow-up appointments was emphasized today. Patient was today advised to limit daily caffeine intake, hydrate appropriately, eat healthy and nutritious foods, engage sleep hygiene measures, engage appropriate exposure to sunlight, engage with hobbies in balance with life necessities, and exercise appropriate to their capacity to do so.     Billing: This encounter is of moderate complexity based on number/complexity of problems addressed today and risk of complications/morbidity: 2+ stable chronic illnesses and prescription management.  Additionally, I provided 60 minutes of dedicated psychotherapy to the patient as documented above.     Parts of this note are electronic transcriptions/translations of spoken language to printed text using the Dragon Dictation system.    Electronically signed by Jose Ortiz MD, 01/27/23, [id number]

## 2023-02-03 ENCOUNTER — TELEPHONE (OUTPATIENT)
Dept: GASTROENTEROLOGY | Facility: CLINIC | Age: 43
End: 2023-02-03
Payer: COMMERCIAL

## 2023-02-03 ENCOUNTER — PATIENT ROUNDING (BHMG ONLY) (OUTPATIENT)
Dept: PSYCHIATRY | Facility: CLINIC | Age: 43
End: 2023-02-03
Payer: COMMERCIAL

## 2023-02-03 NOTE — TELEPHONE ENCOUNTER
Attempted to contact patient to remind them of upcoming procedure on 2/17/2023 left detailed message. Requested a call back.

## 2023-02-03 NOTE — PROGRESS NOTES
February 3, 2023     VOICEMAIL    Hello, may I speak with Alysa CARINA Qiu?    My name is MAGDALENE    I am  with [facility]  [address] [place] [address] [place] [address] [phone].    CALLED AND LVM WITH PT TO WELCOME HER TO OUR PRACTICE AND TO CONTACT OUR OFFICE WITH ANY QUESTIONS.     Thank you, and have a great day.

## 2023-02-07 ENCOUNTER — TELEPHONE (OUTPATIENT)
Dept: GASTROENTEROLOGY | Facility: CLINIC | Age: 43
End: 2023-02-07
Payer: COMMERCIAL

## 2023-02-07 NOTE — TELEPHONE ENCOUNTER
Patient left a voice message today at 1052 wanting to cancel her scope on 2/1/23. If you have any questions please call [phone]

## 2023-02-14 DIAGNOSIS — K21.9 GASTROESOPHAGEAL REFLUX DISEASE WITHOUT ESOPHAGITIS: ICD-10-CM

## 2023-02-14 RX ORDER — PANTOPRAZOLE SODIUM 40 MG/1
40 TABLET, DELAYED RELEASE ORAL DAILY
Qty: 90 TABLET | Refills: 1 | Status: SHIPPED | OUTPATIENT
Start: 2023-02-14

## 2023-03-17 ENCOUNTER — HOSPITAL ENCOUNTER (OUTPATIENT)
Dept: MAMMOGRAPHY | Facility: HOSPITAL | Age: 43
Discharge: HOME | End: 2023-03-17
Admitting: FAMILY MEDICINE
Payer: COMMERCIAL

## 2023-03-17 DIAGNOSIS — Z12.31 SCREENING MAMMOGRAM, ENCOUNTER FOR: ICD-10-CM

## 2023-03-17 PROCEDURE — 77063 BREAST TOMOSYNTHESIS BI: CPT

## 2023-03-17 PROCEDURE — 77067 SCR MAMMO BI INCL CAD: CPT

## 2023-05-16 ENCOUNTER — OFFICE VISIT (OUTPATIENT)
Dept: FAMILY MEDICINE CLINIC | Age: 43
End: 2023-05-16
Payer: COMMERCIAL

## 2023-05-16 ENCOUNTER — TELEPHONE (OUTPATIENT)
Dept: FAMILY MEDICINE CLINIC | Age: 43
End: 2023-05-16

## 2023-05-16 VITALS
OXYGEN SATURATION: 100 % | BODY MASS INDEX: 31.71 KG/M2 | WEIGHT: 202 LBS | DIASTOLIC BLOOD PRESSURE: 77 MMHG | SYSTOLIC BLOOD PRESSURE: 125 MMHG | TEMPERATURE: 98.7 F | HEIGHT: 67 IN | HEART RATE: 75 BPM

## 2023-05-16 DIAGNOSIS — K64.9 HEMORRHOIDS, UNSPECIFIED HEMORRHOID TYPE: Primary | ICD-10-CM

## 2023-05-16 RX ORDER — PRAMOXINE HYDROCHLORIDE HYDROCORTISONE ACETATE 100; 100 MG/10G; MG/10G
1 AEROSOL, FOAM TOPICAL 2 TIMES DAILY
Qty: 10 G | Refills: 0 | Status: SHIPPED | OUTPATIENT
Start: 2023-05-16 | End: 2023-05-16

## 2023-05-16 RX ORDER — DOCUSATE SODIUM 100 MG/1
100 CAPSULE, LIQUID FILLED ORAL 2 TIMES DAILY
Qty: 60 CAPSULE | Refills: 0 | Status: SHIPPED | OUTPATIENT
Start: 2023-05-16 | End: 2023-06-15

## 2023-05-16 RX ORDER — HYDROCORTISONE ACETATE 25 MG/1
25 SUPPOSITORY RECTAL 2 TIMES DAILY
Qty: 6 SUPPOSITORY | Refills: 0 | Status: SHIPPED | OUTPATIENT
Start: 2023-05-16 | End: 2023-05-19

## 2023-05-16 NOTE — TELEPHONE ENCOUNTER
Pharmacy Name: [facility] [id number] - [place], [place] - [address] [facility] - [phone]  - [phone]      Pharmacy representative name: NASIM    Pharmacy representative phone number: [phone]     What medication are you calling in regards to: Hydrocortisone Ace-Pramoxine 1-1 % 1 application Rectal 2 Times Daily      What question does the pharmacy have: THE PHARMACY STATED THIS MEDICATION DOES NOT HAVE A GENERIC AND IS GOING TO BE TOO EXPENSIVE FOR THE PATIENT. HE WOULD LIKE A NEW PRESCRIPTION FOR ALTERNATE MEDICATION SENT OR CALL BACK WITH PCP RECOMMENDATION    Who is the provider that prescribed the medication: WINSOME DAMON

## 2023-05-16 NOTE — PROGRESS NOTES
"Subjective     CHIEF COMPLAINT    Chief Complaint   Patient presents with   • Hemorrhoids     X 1 week.             History of Present Illness  This is a 42-year-old female presenting to clinic with complaint of \"hemorrhoids\" for the last week.  She has rectal pain and pain with bowel movements but denies any bleeding.  She states they are internal hemorrhoids.  She has never had these previously.            Review of Systems   Constitutional: Negative for chills and fever.   Gastrointestinal: Positive for constipation and rectal pain. Negative for abdominal pain, blood in stool, nausea and vomiting.            Past Medical History:   Diagnosis Date   • Acne vulgaris    • Anxiety    • Chronic pain disorder    • GERD without esophagitis    • Hyperlipidemia    • Hypothyroidism    • Major depressive disorder    • PTSD (post-traumatic stress disorder)    • Substance abuse    • Vitamin B12 deficiency anemia due to intrinsic factor deficiency    • Vitamin D deficiency             Past Surgical History:   Procedure Laterality Date   • APPENDECTOMY     •  SECTION      x2   • SKIN BIOPSY              Family History   Problem Relation Age of Onset   • Depression Mother    • Hyperlipidemia Mother    • Stroke Mother    • Thyroid disease Mother    • Depression Father    • Alcohol abuse Father    • Hyperlipidemia Father    • Diabetes Father    • Heart attack Maternal Uncle    • Depression Maternal Grandmother    • Depression Maternal Grandfather    • Anxiety disorder Maternal Grandfather    • Self-Injurious Behavior  Son    • Suicide Attempts Son    • Depression Son    • Suicidality Son             Social History     Socioeconomic History   • Marital status:    Tobacco Use   • Smoking status: Some Days     Packs/day: 0.25     Years: 15.00     Pack years: 3.75     Types: Cigarettes, Electronic Cigarette     Last attempt to quit: 2021     Years since quittin.4   • Smokeless tobacco: Never   • Tobacco " "comments:     1-5 cigs per day   Vaping Use   • Vaping Use: Every day   • Substances: Nicotine, THC, CBD, Flavoring   Substance and Sexual Activity   • Alcohol use: Yes     Alcohol/week: 8.0 standard drinks     Types: 8 Cans of beer per week     Comment: greater than 4 days a week   • Drug use: Yes     Types: Marijuana   • Sexual activity: Yes     Partners: Male     Birth control/protection: I.U.D.     Comment: IUD needs to be replaced possibly            Allergies   Allergen Reactions   • Tamiflu [Oseltamivir] Mental Status Change     Mental Fog            Current Outpatient Medications on File Prior to Visit   Medication Sig Dispense Refill   • albuterol sulfate  (90 Base) MCG/ACT inhaler Inhale 2 puffs Every 4 (Four) Hours As Needed for Wheezing. 18 g 1   • buPROPion XL (WELLBUTRIN XL) 300 MG 24 hr tablet Take 1 tablet by mouth Daily. 90 tablet 1   • levothyroxine (SYNTHROID, LEVOTHROID) 150 MCG tablet Take 1 tablet by mouth Daily.     • pantoprazole (PROTONIX) 40 MG EC tablet Take 1 tablet by mouth Daily. 90 tablet 1   • spironolactone (ALDACTONE) 100 MG tablet Take 1 tablet by mouth Daily.     • sucralfate (CARAFATE) 1 g tablet Take 1 tablet by mouth 2 (Two) Times a Day As Needed (abdominal pain). On an empty stomach 60 tablet 0   • traZODone (DESYREL) 50 MG tablet Take 0.5 tablets by mouth Every Night. Indications: Trouble Sleeping, Major Depressive Disorder 15 tablet 1   • [DISCONTINUED] escitalopram (LEXAPRO) 20 MG tablet Take 1 tablet by mouth Daily. 90 tablet 1   • [DISCONTINUED] ARIPiprazole (ABILIFY) 2 MG tablet Take 1 tablet by mouth Daily. 30 tablet 0     No current facility-administered medications on file prior to visit.            /77 (BP Location: Right arm, Patient Position: Sitting, Cuff Size: Small Adult)   Pulse 75   Temp 98.7 °F (37.1 °C) (Oral)   Ht 170.2 cm (67.01\")   Wt 91.6 kg (202 lb)   SpO2 100% Comment: room air  BMI 31.63 kg/m²          Objective     Physical " Exam  Vitals and nursing note reviewed.   Constitutional:       General: She is not in acute distress.     Appearance: Normal appearance.   Pulmonary:      Effort: Pulmonary effort is normal. No respiratory distress.   Genitourinary:     Comments: Patient declines exam.   Skin:     General: Skin is warm and dry.   Neurological:      Mental Status: She is alert and oriented to person, place, and time.   Psychiatric:         Mood and Affect: Mood normal.         Behavior: Behavior normal.              Procedures                    Lab Results (last 24 hours)     ** No results found for the last 24 hours. **                No Radiology Exams Resulted Within Past 24 Hours             Alysa refused a physical exam today.  We discussed that her symptoms could be caused by things other than hemorrhoids such as anal fissures or abscesses which would be treated very differently.  She continued to decline and exam and requested that I just send something in to help with her hemorrhoids.  We will treat with Colace and Proctofoam as below but she was encouraged to return to the office if this fails to improve her symptoms or she has any other concerns.       Diagnoses and all orders for this visit:    1. Hemorrhoids, unspecified hemorrhoid type (Primary)  -     docusate sodium (Colace) 100 MG capsule; Take 1 capsule by mouth 2 (Two) Times a Day for 30 days.  Dispense: 60 capsule; Refill: 0  -     Hydrocort-Pramoxine, Perianal, (Proctofoam HC) 1-1 % rectal foam; Insert 1 application into the rectum 2 (Two) Times a Day.  Dispense: 10 g; Refill: 0                           FOR FULL DISCHARGE INSTRUCTIONS/COMMENTS/HANDOUTS please see the   AVS

## 2023-07-25 DIAGNOSIS — F33.1 MAJOR DEPRESSIVE DISORDER, RECURRENT EPISODE, MODERATE DEGREE: ICD-10-CM

## 2023-07-25 RX ORDER — BUPROPION HYDROCHLORIDE 300 MG/1
300 TABLET ORAL DAILY
Qty: 90 TABLET | Refills: 0 | Status: SHIPPED | OUTPATIENT
Start: 2023-07-25

## 2023-08-11 ENCOUNTER — TELEPHONE (OUTPATIENT)
Dept: PSYCHIATRY | Facility: CLINIC | Age: 43
End: 2023-08-11
Payer: COMMERCIAL

## 2023-08-11 NOTE — TELEPHONE ENCOUNTER
[facility] CALLED PATIENT TO FOLLOW UP ON REFERRAL ORDER, PATIENT STATES THAT SHE HAD DECIDED NOT TO GO. CLOSING OUT REFERRAL ORDER.

## 2023-08-24 DIAGNOSIS — K21.9 GASTROESOPHAGEAL REFLUX DISEASE WITHOUT ESOPHAGITIS: ICD-10-CM

## 2023-08-24 RX ORDER — PANTOPRAZOLE SODIUM 40 MG/1
40 TABLET, DELAYED RELEASE ORAL DAILY
Qty: 90 TABLET | Refills: 0 | Status: SHIPPED | OUTPATIENT
Start: 2023-08-24

## 2023-09-08 ENCOUNTER — OFFICE VISIT (OUTPATIENT)
Dept: FAMILY MEDICINE CLINIC | Age: 43
End: 2023-09-08
Payer: COMMERCIAL

## 2023-09-08 VITALS
DIASTOLIC BLOOD PRESSURE: 80 MMHG | WEIGHT: 200.2 LBS | HEART RATE: 87 BPM | SYSTOLIC BLOOD PRESSURE: 132 MMHG | HEIGHT: 67 IN | OXYGEN SATURATION: 97 % | BODY MASS INDEX: 31.42 KG/M2

## 2023-09-08 DIAGNOSIS — F33.1 MAJOR DEPRESSIVE DISORDER, RECURRENT EPISODE, MODERATE DEGREE: Primary | ICD-10-CM

## 2023-09-08 DIAGNOSIS — Z15.89 HLA B27 (HLA B27 POSITIVE): ICD-10-CM

## 2023-09-08 DIAGNOSIS — F41.8 INSOMNIA SECONDARY TO DEPRESSION WITH ANXIETY: ICD-10-CM

## 2023-09-08 DIAGNOSIS — F41.1 GAD (GENERALIZED ANXIETY DISORDER): ICD-10-CM

## 2023-09-08 DIAGNOSIS — K59.09 CHRONIC CONSTIPATION: ICD-10-CM

## 2023-09-08 DIAGNOSIS — F51.05 INSOMNIA SECONDARY TO DEPRESSION WITH ANXIETY: ICD-10-CM

## 2023-09-08 PROBLEM — Z86.16 HISTORY OF COVID-19: Status: RESOLVED | Noted: 2021-12-29 | Resolved: 2023-09-08

## 2023-09-08 PROCEDURE — 99214 OFFICE O/P EST MOD 30 MIN: CPT | Performed by: FAMILY MEDICINE

## 2023-09-08 RX ORDER — DOCUSATE SODIUM 100 MG/1
100 CAPSULE, LIQUID FILLED ORAL 2 TIMES DAILY
COMMUNITY
End: 2023-09-08 | Stop reason: SDUPTHER

## 2023-09-08 RX ORDER — SERTRALINE HYDROCHLORIDE 25 MG/1
25 TABLET, FILM COATED ORAL NIGHTLY
Qty: 30 TABLET | Refills: 5 | Status: SHIPPED | OUTPATIENT
Start: 2023-09-08

## 2023-09-08 RX ORDER — DOCUSATE SODIUM 100 MG/1
100 CAPSULE, LIQUID FILLED ORAL 2 TIMES DAILY PRN
Qty: 60 CAPSULE | Refills: 5 | Status: SHIPPED | OUTPATIENT
Start: 2023-09-08

## 2023-09-08 RX ORDER — TRAZODONE HYDROCHLORIDE 50 MG/1
25 TABLET ORAL NIGHTLY
Qty: 30 TABLET | Refills: 1 | Status: SHIPPED | OUTPATIENT
Start: 2023-09-08

## 2023-09-08 RX ORDER — BUPROPION HYDROCHLORIDE 300 MG/1
300 TABLET ORAL DAILY
Qty: 90 TABLET | Refills: 1 | Status: SHIPPED | OUTPATIENT
Start: 2023-09-08

## 2023-09-08 NOTE — PROGRESS NOTES
"Chief Complaint  Depression (Med refills)    Subjective          Alysa Qiu presents to [facility] today for follow-up of routine problems.    She worries that she is premenopausal.  She has an appt coming with Dr. Pfeiffer GYN to evaluate.    She is on bupropion for depression and anxiety.  He has followed with Joaquina Chacon and Dr. Bettencourt [facility] in the past.  She is interested in alternative treatments, so he referred her to clinic called [facility] in [facility].  However, due to the difficulty of getting up there, she elected not to pursue that for now.  She stopped her Lexapro (ineffective).  Has used Prozac (remotely), Effexor (\"snappy brain\").  She is on trazodone for sleep.  Working well.      She is on levothyroxine for hypothyroidism.  She is following with Dr. Dave Lord who manages the levothyroxine.    She is on spironolactone for acne.  Acne is well controlled.     She is on pantoprazole for GERD.  She has sucralfate that she uses as needed.      Current Outpatient Medications:     albuterol sulfate  (90 Base) MCG/ACT inhaler, Inhale 2 puffs Every 4 (Four) Hours As Needed for Wheezing., Disp: 18 g, Rfl: 1    buPROPion XL (WELLBUTRIN XL) 300 MG 24 hr tablet, Take 1 tablet by mouth Daily., Disp: 90 tablet, Rfl: 1    docusate sodium (COLACE) 100 MG capsule, Take 1 capsule by mouth 2 (Two) Times a Day As Needed for Constipation., Disp: 60 capsule, Rfl: 5    levothyroxine (SYNTHROID, LEVOTHROID) 150 MCG tablet, Take 1 tablet by mouth Daily., Disp: , Rfl:     pantoprazole (PROTONIX) 40 MG EC tablet, Take 1 tablet by mouth Daily., Disp: 90 tablet, Rfl: 0    spironolactone (ALDACTONE) 100 MG tablet, Take 1 tablet by mouth Daily., Disp: , Rfl:     sucralfate (CARAFATE) 1 g tablet, Take 1 tablet by mouth 2 (Two) Times a Day As Needed (abdominal pain). On an empty stomach, Disp: 60 tablet, Rfl: 0    traZODone (DESYREL) 50 MG tablet, Take 0.5 tablets by mouth Every " "Night. Indications: Major Depressive Disorder, Trouble Sleeping, Disp: 30 tablet, Rfl: 1    sertraline (ZOLOFT) 25 MG tablet, Take 1 tablet by mouth Every Night., Disp: 30 tablet, Rfl: 5    Allergies:  Tamiflu [oseltamivir]      Objective   Vital Signs:   Vitals:    09/08/23 0912   BP: 132/80   BP Location: Left arm   Patient Position: Sitting   Cuff Size: Adult   Pulse: 87   SpO2: 97%   Weight: 90.8 kg (200 lb 3.2 oz)   Height: 170.2 cm (67.01\")       Physical Exam  Vitals reviewed.   Constitutional:       General: She is not in acute distress.     Appearance: Normal appearance. She is well-developed.   HENT:      Head: Normocephalic and atraumatic.      Right Ear: External ear normal.      Left Ear: External ear normal.   Eyes:      Extraocular Movements: Extraocular movements intact.      Conjunctiva/sclera: Conjunctivae normal.      Pupils: Pupils are equal, round, and reactive to light.   Cardiovascular:      Rate and Rhythm: Normal rate and regular rhythm.      Heart sounds: No murmur heard.  Pulmonary:      Effort: Pulmonary effort is normal.      Breath sounds: Normal breath sounds. No wheezing, rhonchi or rales.   Abdominal:      General: Bowel sounds are normal. There is no distension.      Palpations: Abdomen is soft.      Tenderness: There is no abdominal tenderness.   Musculoskeletal:         General: Normal range of motion.   Neurological:      Mental Status: She is alert.   Psychiatric:         Mood and Affect: Affect normal.           Assessment and Plan    Diagnoses and all orders for this visit:    1. Major depressive disorder, recurrent episode, moderate degree (Primary)  Assessment & Plan:  Alysa is interested in alternative treatments for depression and does have the name of the clinic up in [place] ([facility]) but has elected to defer pursuing that at this time due to the difficulty of getting up there.  For now, we will continue to manage with standard medications.  She continues on " bupropion 300 mg daily and we are going to add in sertraline 25 mg nightly, uptitrating this as needed.  She continues on trazodone 25 mg nightly.  I have sent in a refill on that today as well.  I will see her back in 6 weeks or sooner as needed to follow-up.    Orders:  -     sertraline (ZOLOFT) 25 MG tablet; Take 1 tablet by mouth Every Night.  Dispense: 30 tablet; Refill: 5  -     buPROPion XL (WELLBUTRIN XL) 300 MG 24 hr tablet; Take 1 tablet by mouth Daily.  Dispense: 90 tablet; Refill: 1  -     traZODone (DESYREL) 50 MG tablet; Take 0.5 tablets by mouth Every Night. Indications: Major Depressive Disorder, Trouble Sleeping  Dispense: 30 tablet; Refill: 1    2. JEANNETTE (generalized anxiety disorder)  Assessment & Plan:  As per depression plan.    Orders:  -     sertraline (ZOLOFT) 25 MG tablet; Take 1 tablet by mouth Every Night.  Dispense: 30 tablet; Refill: 5    3. HLA B27 (HLA B27 positive)  Overview:  Uncle with ankylosing spondylitis.  Not currently symptomatic.  BZM      4. Insomnia secondary to depression with anxiety  -     traZODone (DESYREL) 50 MG tablet; Take 0.5 tablets by mouth Every Night. Indications: Major Depressive Disorder, Trouble Sleeping  Dispense: 30 tablet; Refill: 1    5. Chronic constipation  Assessment & Plan:  Refills sent on docusate.  She may also benefit from adding a daily fiber supplement, which she will try.    Orders:  -     docusate sodium (COLACE) 100 MG capsule; Take 1 capsule by mouth 2 (Two) Times a Day As Needed for Constipation.  Dispense: 60 capsule; Refill: 5        Follow Up   Return in about 6 weeks (around 10/20/2023) for Recheck.  Patient was given instructions and counseling regarding her condition or for health maintenance advice. Please see specific information pulled into the AVS if appropriate.

## 2023-09-08 NOTE — ASSESSMENT & PLAN NOTE
Alysa is interested in alternative treatments for depression and does have the name of the clinic up in [place] ([facility] but has elected to defer pursuing that at this time due to the difficulty of getting up there.  For now, we will continue to manage with standard medications.  She continues on bupropion 300 mg daily and we are going to add in sertraline 25 mg nightly, uptitrating this as needed.  She continues on trazodone 25 mg nightly.  I have sent in a refill on that today as well.  I will see her back in 6 weeks or sooner as needed to follow-up.

## 2023-09-08 NOTE — ASSESSMENT & PLAN NOTE
Refills sent on docusate.  She may also benefit from adding a daily fiber supplement, which she will try.

## 2023-10-07 DIAGNOSIS — K64.9 HEMORRHOIDS, UNSPECIFIED HEMORRHOID TYPE: ICD-10-CM

## 2023-10-09 RX ORDER — HYDROCORTISONE ACETATE 25 MG
SUPPOSITORY, RECTAL RECTAL
Qty: 6 SUPPOSITORY | Refills: 0 | OUTPATIENT
Start: 2023-10-09

## 2023-10-13 ENCOUNTER — TELEMEDICINE (OUTPATIENT)
Dept: FAMILY MEDICINE CLINIC | Age: 43
End: 2023-10-13
Payer: COMMERCIAL

## 2023-10-13 DIAGNOSIS — F33.1 MAJOR DEPRESSIVE DISORDER, RECURRENT EPISODE, MODERATE DEGREE: Primary | ICD-10-CM

## 2023-10-13 DIAGNOSIS — F41.1 GAD (GENERALIZED ANXIETY DISORDER): ICD-10-CM

## 2023-10-13 PROCEDURE — 99214 OFFICE O/P EST MOD 30 MIN: CPT | Performed by: FAMILY MEDICINE

## 2023-10-13 RX ORDER — FLUCONAZOLE 200 MG/1
TABLET ORAL AS NEEDED
COMMUNITY
Start: 2023-10-01

## 2023-10-13 NOTE — ASSESSMENT & PLAN NOTE
Alysa feels that the sertraline 25 mg QHS has been helpful.  We discussed whether she wants to increase it, but at this point, she would like to stay the course for another few weeks.  Continue Wellbutrin 300 mg daily and the sertraline at 25 mg.  I will see her back in 4 weeks and we will see how she is doing and if she needs an increase.  October is a tough month for her.  It is the anniversary of her son Tam's death by suicide.  That happened 4 years ago.  She has done therapy in the past including with [facility], H[facility] Grimm and [facility] S[facility] but had to stop with the private pay therapy after she hit financial barriers.  I did let her know about the [facility] group in [facility].  She might also try looking into whether her employer offers an EAP if she is interested in that.  Call with any problems and otherwise I will see her in 4 weeks.

## 2023-10-13 NOTE — PROGRESS NOTES
"Chief Complaint  Depression (*video visit [phone]* 6 week f/u)    Alysa Qiu has consented to use a telehealth visit for her medical care today: Yes.  Additional staff present for the encounter was Leticia Hebert MA.     This telehealth visit was conducted today via video conference.  I am present in the office and conducting the visit via [employer] on my phone.  lAysa is present at home and conducting her end of the visit using her smart phone.    Subjective          Alysa Qiu presents to [facility] today for routine f/u of chronic issues.    She is on bupropion with sertraline added at 25 mg daily at last visit for depression and anxiety.  That seems to be working  well so far.  She did have some headaches for the first week but those subsided.  October is a tough month as it is the anniversary of her son's death.  She has followed with Joaquina Chacon and Dr. Bettencourt [facility]iat[facility] in the past.  She is interested in alternative treatments, so he referred her to clinic called [facility] in [facility].  However, due to the difficulty of getting up there, she elected not to pursue that for now.  She has also done therapy previously.  Saw first Hesham Grimm and then [facility] Sánchez, but she had to stop just before they were ready to start EMDR due to financial barriers.  She has also gone to [facility] for grief counseling, a group that focuses on parents who have lost children.  She stopped her Lexapro (ineffective).  Has used Prozac (remotely), Effexor (\"snappy brain\").  She is on trazodone for sleep.  Sleeping okay.    She is on levothyroxine for hypothyroidism.  She is following with Dr. Dave Lord who manages the levothyroxine.    She is on spironolactone for acne.  Acne is well controlled.     She is on pantoprazole for GERD.  She has sucralfate that she uses as needed.      Current Outpatient Medications:     albuterol sulfate  (90 Base) MCG/ACT " inhaler, Inhale 2 puffs Every 4 (Four) Hours As Needed for Wheezing., Disp: 18 g, Rfl: 1    buPROPion XL (WELLBUTRIN XL) 300 MG 24 hr tablet, Take 1 tablet by mouth Daily., Disp: 90 tablet, Rfl: 1    docusate sodium (COLACE) 100 MG capsule, Take 1 capsule by mouth 2 (Two) Times a Day As Needed for Constipation., Disp: 60 capsule, Rfl: 5    fluconazole (DIFLUCAN) 200 MG tablet, As Needed., Disp: , Rfl:     levothyroxine (SYNTHROID, LEVOTHROID) 150 MCG tablet, Take 1 tablet by mouth Daily., Disp: , Rfl:     pantoprazole (PROTONIX) 40 MG EC tablet, Take 1 tablet by mouth Daily., Disp: 90 tablet, Rfl: 0    sertraline (ZOLOFT) 25 MG tablet, Take 1 tablet by mouth Every Night., Disp: 30 tablet, Rfl: 5    spironolactone (ALDACTONE) 100 MG tablet, Take 1 tablet by mouth Daily., Disp: , Rfl:     sucralfate (CARAFATE) 1 g tablet, Take 1 tablet by mouth 2 (Two) Times a Day As Needed (abdominal pain). On an empty stomach, Disp: 60 tablet, Rfl: 0    traZODone (DESYREL) 50 MG tablet, Take 0.5 tablets by mouth Every Night. Indications: Major Depressive Disorder, Trouble Sleeping, Disp: 30 tablet, Rfl: 1  There are no discontinued medications.      Allergies:  Tamiflu [oseltamivir]      Objective   Vital Signs:   There were no vitals filed for this visit.      Physical Exam  Constitutional:       Appearance: Normal appearance.   HENT:      Head: Normocephalic and atraumatic.   Eyes:      Extraocular Movements: Extraocular movements intact.      Conjunctiva/sclera: Conjunctivae normal.   Pulmonary:      Effort: Pulmonary effort is normal. No respiratory distress.   Musculoskeletal:         General: Normal range of motion.   Skin:     General: Skin is warm and dry.   Neurological:      General: No focal deficit present.      Mental Status: She is alert and oriented to person, place, and time.   Psychiatric:         Mood and Affect: Mood normal.         Behavior: Behavior normal.         Thought Content: Thought content normal.          Judgment: Judgment normal.           Lab Results   Component Value Date    GLUCOSE 100 (H) 12/07/2022    BUN 7 12/07/2022    CREATININE 0.95 12/07/2022    EGFRIFNONA 68 08/18/2021    EGFRIFAFRI 83 08/18/2021    BCR 7.4 12/07/2022    K 3.9 12/07/2022    CO2 26.0 12/07/2022    CALCIUM 10.0 12/07/2022    ALBUMIN 4.00 12/07/2022    LABIL2 1.3 (L) 07/28/2020    AST 16 12/07/2022    ALT 21 12/07/2022       Lab Results   Component Value Date    CHOL 211 (H) 08/18/2021    CHLPL 238 (H) 07/28/2020    TRIG 162 (H) 08/18/2021    HDL 31 (L) 08/18/2021     (H) 08/18/2021            Assessment and Plan    Diagnoses and all orders for this visit:    1. Major depressive disorder, recurrent episode, moderate degree (Primary)  Assessment & Plan:  Alysa feels that the sertraline 25 mg QHS has been helpful.  We discussed whether she wants to increase it, but at this point, she would like to stay the course for another few weeks.  Continue Wellbutrin 300 mg daily and the sertraline at 25 mg.  I will see her back in 4 weeks and we will see how she is doing and if she needs an increase.  October is a tough month for her.  It is the anniversary of her son Tam's death by suicide.  That happened 4 years ago.  She has done therapy in the past including with [facility], Hesham Grimm and Jamel Sánchez but had to stop with the private pay therapy after she hit financial barriers.  I did let her know about the [facility]osp[facility] grief support group in [facility].  She might also try looking into whether her employer offers an EAP if she is interested in that.  Call with any problems and otherwise I will see her in 4 weeks.      2. JEANNETTE (generalized anxiety disorder)  Assessment & Plan:  As per depression plan.          Follow Up   Return in about 4 weeks (around 11/10/2023) for TELEHEALTH OK, OK to use same day spot if needed.  Patient was given instructions and counseling regarding her condition or for health maintenance advice. Please see  specific information pulled into the AVS if appropriate.           10/13/2023

## 2023-10-16 ENCOUNTER — TELEPHONE (OUTPATIENT)
Dept: FAMILY MEDICINE CLINIC | Age: 43
End: 2023-10-16
Payer: COMMERCIAL

## 2023-10-16 NOTE — TELEPHONE ENCOUNTER
----- Message from Ann Melvin MD sent at 10/13/2023  1:53 PM EDT -----  Please call pt to schedule a follow-up appt for 4 weeks (depression, telehealth OK, OK to use same day spot).  Thanks, BZMAXIMILIAN

## 2023-10-30 ENCOUNTER — TELEPHONE (OUTPATIENT)
Dept: FAMILY MEDICINE CLINIC | Age: 43
End: 2023-10-30
Payer: COMMERCIAL

## 2023-10-30 NOTE — TELEPHONE ENCOUNTER
Pt calling stating she can not taking the Zoloft.  It is making her too sleepy.  She has even missed a day of work.  She has been on it for about 1 1/2 months.  She has not taken it for 2 days, but wants to know if she needs to wean off of it?  Please advise.

## 2023-10-31 NOTE — TELEPHONE ENCOUNTER
Okay to stop the sertraline as she is on the lowest dose.  If she has any symptoms of withdrawal, she can take a dose every other day for a week or so to get off it a little more gradually.  Does she want to try substituting something for the sertraline?  If yes, we could try her on some low-dose Cymbalta and see how she does with that.  Thanks, SAROJ

## 2023-10-31 NOTE — TELEPHONE ENCOUNTER
Pt informed and states she will hold off on any meds for now and will discuss in further details at her appt on 11/10/23.

## 2023-11-10 ENCOUNTER — TELEMEDICINE (OUTPATIENT)
Dept: FAMILY MEDICINE CLINIC | Age: 43
End: 2023-11-10
Payer: COMMERCIAL

## 2023-11-10 DIAGNOSIS — F41.1 GAD (GENERALIZED ANXIETY DISORDER): Primary | ICD-10-CM

## 2023-11-10 DIAGNOSIS — F33.1 MAJOR DEPRESSIVE DISORDER, RECURRENT EPISODE, MODERATE DEGREE: ICD-10-CM

## 2023-11-10 PROCEDURE — 99214 OFFICE O/P EST MOD 30 MIN: CPT | Performed by: FAMILY MEDICINE

## 2023-11-10 NOTE — ASSESSMENT & PLAN NOTE
No longer using the sertraline due to adverse effects.  She does continue on Wellbutrin 300 mg daily.  She is now pursuing the possibility of going on marijuana.  She would pursue this through another clinic other than ours as by my records, she carries an anxiety the diagnosis of generalized anxiety disorder rather than PTSD and thus would not qualify for a pre-pardon letter from me.  We did discuss using edibles as opposed to smoking marijuana should she choose to pursue this route.  We also discussed the legal ramifications of using marijuana currently.  She is still fact-finding at this point.  Encouraged her just to keep open lines of communication with me as to how she chooses to move forward, as she has done heretofore.

## 2023-11-10 NOTE — PROGRESS NOTES
"Chief Complaint  Depression (*video visit [phone]* 4 week f/u)    Alysa Qiu has consented to use a telehealth visit for her medical care today: Yes.  Additional staff present for the encounter was Leticia Hebert MA.  This telehealth visit was conducted today via video conference.  I am present in the office and conducting the visit via e[employer]ight on my office computer.  The call was disrupted so we did switch to [employer] [facility] through.  Alysa is present at home and conducting her end of the visit using her smart phone.    Subjective          Alysa Qiu presents to [facility] today for f/u of chronic issues.    She has stopped taking the sertraline because it was making her too sedated.  Sedation improved once she came off of that.  She does continue on bupropion 300 mg for generalized anxiety disorder and depression.  She has followed with Joaquina Chacon and Dr. Bettencourt [facility] in the past.  He had suggested Abilify but she was hesitant to go on an antipsychotic.  She is interested in alternative treatments, so he referred her to clinic called [facility] up in [facility].  However, due to the difficulty of getting up there, she elected not to pursue that for now.  She has also done therapy previously.  Saw first Hesham Grimm and then Jamel Sánchez, but she had to stop just before they were ready to start EMDR due to financial barriers.  She has also gone to [facility] for grief counseling, a group that focuses on parents who have lost children.  She stopped her Lexapro (ineffective).  Has used Prozac (remotely), Effexor (\"snappy brain\").  She is on trazodone for sleep.  Sleeping okay.  She is now pursuing the possibility of using marijuana.    She is on levothyroxine for hypothyroidism.  She is following with Dr. Dave Lord who manages the levothyroxine.    She is on spironolactone for acne.  Acne is well controlled.     She is on pantoprazole for GERD.  She " has sucralfate that she uses as needed.      Current Outpatient Medications:     albuterol sulfate  (90 Base) MCG/ACT inhaler, Inhale 2 puffs Every 4 (Four) Hours As Needed for Wheezing., Disp: 18 g, Rfl: 1    buPROPion XL (WELLBUTRIN XL) 300 MG 24 hr tablet, Take 1 tablet by mouth Daily., Disp: 90 tablet, Rfl: 1    docusate sodium (COLACE) 100 MG capsule, Take 1 capsule by mouth 2 (Two) Times a Day As Needed for Constipation., Disp: 60 capsule, Rfl: 5    fluconazole (DIFLUCAN) 200 MG tablet, As Needed., Disp: , Rfl:     levothyroxine (SYNTHROID, LEVOTHROID) 150 MCG tablet, Take 1 tablet by mouth Daily., Disp: , Rfl:     pantoprazole (PROTONIX) 40 MG EC tablet, Take 1 tablet by mouth Daily., Disp: 90 tablet, Rfl: 0    spironolactone (ALDACTONE) 100 MG tablet, Take 1 tablet by mouth Daily., Disp: , Rfl:     sucralfate (CARAFATE) 1 g tablet, Take 1 tablet by mouth 2 (Two) Times a Day As Needed (abdominal pain). On an empty stomach, Disp: 60 tablet, Rfl: 0    traZODone (DESYREL) 50 MG tablet, Take 0.5 tablets by mouth Every Night. Indications: Major Depressive Disorder, Trouble Sleeping, Disp: 30 tablet, Rfl: 1  Medications Discontinued During This Encounter   Medication Reason    sertraline (ZOLOFT) 25 MG tablet Historical Med - Therapy completed         Allergies:  Tamiflu [oseltamivir]      Objective   Vital Signs:   There were no vitals filed for this visit.      Physical Exam  Constitutional:       Appearance: Normal appearance.   HENT:      Head: Normocephalic and atraumatic.   Eyes:      Extraocular Movements: Extraocular movements intact.      Conjunctiva/sclera: Conjunctivae normal.   Pulmonary:      Effort: Pulmonary effort is normal. No respiratory distress.   Musculoskeletal:         General: Normal range of motion.   Skin:     General: Skin is warm and dry.   Neurological:      General: No focal deficit present.      Mental Status: She is alert and oriented to person, place, and time.   Psychiatric:          Mood and Affect: Mood normal.         Behavior: Behavior normal.         Thought Content: Thought content normal.         Judgment: Judgment normal.           Lab Results   Component Value Date    GLUCOSE 100 (H) 12/07/2022    BUN 7 12/07/2022    CREATININE 0.95 12/07/2022    EGFRIFNONA 68 08/18/2021    EGFRIFAFRI 83 08/18/2021    BCR 7.4 12/07/2022    K 3.9 12/07/2022    CO2 26.0 12/07/2022    CALCIUM 10.0 12/07/2022    ALBUMIN 4.00 12/07/2022    LABIL2 1.3 (L) 07/28/2020    AST 16 12/07/2022    ALT 21 12/07/2022       Lab Results   Component Value Date    CHOL 211 (H) 08/18/2021    CHLPL 238 (H) 07/28/2020    TRIG 162 (H) 08/18/2021    HDL 31 (L) 08/18/2021     (H) 08/18/2021            Assessment and Plan    Diagnoses and all orders for this visit:    1. JEANNETTE (generalized anxiety disorder) (Primary)  Assessment & Plan:  No longer using the sertraline due to adverse effects.  She does continue on Wellbutrin 300 mg daily.  She is now pursuing the possibility of going on marijuana.  She would pursue this through another clinic other than ours as by my records, she carries an anxiety the diagnosis of generalized anxiety disorder rather than PTSD and thus would not qualify for a pre-pardon letter from me.  We did discuss using edibles as opposed to smoking marijuana should she choose to pursue this route.  We also discussed the legal ramifications of using marijuana currently.  She is still fact-finding at this point.  Encouraged her just to keep open lines of communication with me as to how she chooses to move forward, as she has done heretofore.      2. Major depressive disorder, recurrent episode, moderate degree  Assessment & Plan:  As per anxiety plan.            Follow Up   Return in about 3 months (around 2/10/2024) for Recheck.  Patient was given instructions and counseling regarding her condition or for health maintenance advice. Please see specific information pulled into the AVS if  appropriate.           10/13/2023

## 2023-11-29 DIAGNOSIS — K21.9 GASTROESOPHAGEAL REFLUX DISEASE WITHOUT ESOPHAGITIS: ICD-10-CM

## 2023-11-29 RX ORDER — PANTOPRAZOLE SODIUM 40 MG/1
40 TABLET, DELAYED RELEASE ORAL DAILY
Qty: 90 TABLET | Refills: 0 | Status: SHIPPED | OUTPATIENT
Start: 2023-11-29

## 2024-02-14 ENCOUNTER — TRANSCRIBE ORDERS (OUTPATIENT)
Dept: ADMINISTRATIVE | Facility: HOSPITAL | Age: 44
End: 2024-02-14
Payer: COMMERCIAL

## 2024-02-14 DIAGNOSIS — Z12.31 VISIT FOR SCREENING MAMMOGRAM: Primary | ICD-10-CM

## 2024-02-16 ENCOUNTER — OFFICE VISIT (OUTPATIENT)
Dept: FAMILY MEDICINE CLINIC | Age: 44
End: 2024-02-16
Payer: COMMERCIAL

## 2024-02-16 VITALS
HEART RATE: 67 BPM | DIASTOLIC BLOOD PRESSURE: 81 MMHG | OXYGEN SATURATION: 97 % | WEIGHT: 192.4 LBS | SYSTOLIC BLOOD PRESSURE: 124 MMHG | BODY MASS INDEX: 30.2 KG/M2 | HEIGHT: 67 IN

## 2024-02-16 DIAGNOSIS — E78.2 MIXED HYPERLIPIDEMIA: ICD-10-CM

## 2024-02-16 DIAGNOSIS — K21.9 GASTROESOPHAGEAL REFLUX DISEASE WITHOUT ESOPHAGITIS: ICD-10-CM

## 2024-02-16 DIAGNOSIS — F33.1 MAJOR DEPRESSIVE DISORDER, RECURRENT EPISODE, MODERATE DEGREE: Primary | ICD-10-CM

## 2024-02-16 DIAGNOSIS — E03.9 ACQUIRED HYPOTHYROIDISM: ICD-10-CM

## 2024-02-16 DIAGNOSIS — Z11.59 ENCOUNTER FOR SCREENING FOR OTHER VIRAL DISEASES: ICD-10-CM

## 2024-02-16 DIAGNOSIS — F41.1 GAD (GENERALIZED ANXIETY DISORDER): ICD-10-CM

## 2024-02-16 DIAGNOSIS — E55.9 VITAMIN D DEFICIENCY: ICD-10-CM

## 2024-02-16 PROCEDURE — 99214 OFFICE O/P EST MOD 30 MIN: CPT | Performed by: FAMILY MEDICINE

## 2024-02-16 RX ORDER — PANTOPRAZOLE SODIUM 40 MG/1
40 TABLET, DELAYED RELEASE ORAL DAILY
Qty: 90 TABLET | Refills: 1 | Status: SHIPPED | OUTPATIENT
Start: 2024-02-16

## 2024-02-16 RX ORDER — BUPROPION HYDROCHLORIDE 300 MG/1
300 TABLET ORAL DAILY
Qty: 90 TABLET | Refills: 1 | Status: SHIPPED | OUTPATIENT
Start: 2024-02-16

## 2024-03-01 ENCOUNTER — LAB (OUTPATIENT)
Dept: LAB | Facility: HOSPITAL | Age: 44
End: 2024-03-01
Payer: COMMERCIAL

## 2024-03-01 ENCOUNTER — TRANSCRIBE ORDERS (OUTPATIENT)
Dept: ADMINISTRATIVE | Facility: HOSPITAL | Age: 44
End: 2024-03-01
Payer: COMMERCIAL

## 2024-03-01 DIAGNOSIS — E55.9 VITAMIN D DEFICIENCY: Primary | ICD-10-CM

## 2024-03-01 DIAGNOSIS — Z11.59 ENCOUNTER FOR SCREENING FOR OTHER VIRAL DISEASES: ICD-10-CM

## 2024-03-01 DIAGNOSIS — E78.2 MIXED HYPERLIPIDEMIA: ICD-10-CM

## 2024-03-01 DIAGNOSIS — E03.9 ACQUIRED HYPOTHYROIDISM: ICD-10-CM

## 2024-03-01 DIAGNOSIS — E55.9 VITAMIN D DEFICIENCY: ICD-10-CM

## 2024-03-01 DIAGNOSIS — E83.52 HYPERCALCEMIA: ICD-10-CM

## 2024-03-01 LAB
25(OH)D3 SERPL-MCNC: 18.5 NG/ML (ref 30–100)
ALBUMIN SERPL-MCNC: 4.1 G/DL (ref 3.5–5.2)
ALBUMIN/GLOB SERPL: 1.7 G/DL
ALP SERPL-CCNC: 60 U/L (ref 39–117)
ALT SERPL W P-5'-P-CCNC: 19 U/L (ref 1–33)
ANION GAP SERPL CALCULATED.3IONS-SCNC: 8 MMOL/L (ref 5–15)
AST SERPL-CCNC: 14 U/L (ref 1–32)
BASOPHILS # BLD AUTO: 0.03 10*3/MM3 (ref 0–0.2)
BASOPHILS NFR BLD AUTO: 0.5 % (ref 0–1.5)
BILIRUB SERPL-MCNC: 0.3 MG/DL (ref 0–1.2)
BUN SERPL-MCNC: 8 MG/DL (ref 6–20)
BUN/CREAT SERPL: 7 (ref 7–25)
CALCIUM SPEC-SCNC: 10.3 MG/DL (ref 8.6–10.5)
CHLORIDE SERPL-SCNC: 106 MMOL/L (ref 98–107)
CHOLEST SERPL-MCNC: 208 MG/DL (ref 0–200)
CO2 SERPL-SCNC: 26 MMOL/L (ref 22–29)
CREAT SERPL-MCNC: 1.14 MG/DL (ref 0.57–1)
DEPRECATED RDW RBC AUTO: 45.5 FL (ref 37–54)
EGFRCR SERPLBLD CKD-EPI 2021: 60.6 ML/MIN/1.73
EOSINOPHIL # BLD AUTO: 0.24 10*3/MM3 (ref 0–0.4)
EOSINOPHIL NFR BLD AUTO: 4.1 % (ref 0.3–6.2)
ERYTHROCYTE [DISTWIDTH] IN BLOOD BY AUTOMATED COUNT: 14.5 % (ref 12.3–15.4)
GLOBULIN UR ELPH-MCNC: 2.4 GM/DL
GLUCOSE SERPL-MCNC: 100 MG/DL (ref 65–99)
HCT VFR BLD AUTO: 36.9 % (ref 34–46.6)
HCV AB SER DONR QL: NORMAL
HDLC SERPL-MCNC: 37 MG/DL (ref 40–60)
HGB BLD-MCNC: 12 G/DL (ref 12–15.9)
IMM GRANULOCYTES # BLD AUTO: 0.01 10*3/MM3 (ref 0–0.05)
IMM GRANULOCYTES NFR BLD AUTO: 0.2 % (ref 0–0.5)
LDLC SERPL CALC-MCNC: 148 MG/DL (ref 0–100)
LDLC/HDLC SERPL: 3.94 {RATIO}
LYMPHOCYTES # BLD AUTO: 2.18 10*3/MM3 (ref 0.7–3.1)
LYMPHOCYTES NFR BLD AUTO: 37.1 % (ref 19.6–45.3)
MCH RBC QN AUTO: 27.5 PG (ref 26.6–33)
MCHC RBC AUTO-ENTMCNC: 32.5 G/DL (ref 31.5–35.7)
MCV RBC AUTO: 84.4 FL (ref 79–97)
MONOCYTES # BLD AUTO: 0.64 10*3/MM3 (ref 0.1–0.9)
MONOCYTES NFR BLD AUTO: 10.9 % (ref 5–12)
NEUTROPHILS NFR BLD AUTO: 2.77 10*3/MM3 (ref 1.7–7)
NEUTROPHILS NFR BLD AUTO: 47.2 % (ref 42.7–76)
PLATELET # BLD AUTO: 364 10*3/MM3 (ref 140–450)
PMV BLD AUTO: 10.2 FL (ref 6–12)
POTASSIUM SERPL-SCNC: 4.1 MMOL/L (ref 3.5–5.2)
PROT SERPL-MCNC: 6.5 G/DL (ref 6–8.5)
PTH-INTACT SERPL-MCNC: 86.5 PG/ML (ref 15–65)
RBC # BLD AUTO: 4.37 10*6/MM3 (ref 3.77–5.28)
SODIUM SERPL-SCNC: 140 MMOL/L (ref 136–145)
TRIGL SERPL-MCNC: 127 MG/DL (ref 0–150)
TSH SERPL DL<=0.05 MIU/L-ACNC: 2.55 UIU/ML (ref 0.27–4.2)
VLDLC SERPL-MCNC: 23 MG/DL (ref 5–40)
WBC NRBC COR # BLD AUTO: 5.87 10*3/MM3 (ref 3.4–10.8)

## 2024-03-01 PROCEDURE — 82306 VITAMIN D 25 HYDROXY: CPT

## 2024-03-01 PROCEDURE — 86803 HEPATITIS C AB TEST: CPT

## 2024-03-01 PROCEDURE — 83970 ASSAY OF PARATHORMONE: CPT

## 2024-03-01 PROCEDURE — 80050 GENERAL HEALTH PANEL: CPT

## 2024-03-01 PROCEDURE — 36415 COLL VENOUS BLD VENIPUNCTURE: CPT

## 2024-03-01 PROCEDURE — 82652 VIT D 1 25-DIHYDROXY: CPT

## 2024-03-01 PROCEDURE — 80061 LIPID PANEL: CPT

## 2024-03-04 LAB — 1,25(OH)2D SERPL-MCNC: 84.2 PG/ML (ref 24.8–81.5)

## 2024-03-05 ENCOUNTER — PATIENT MESSAGE (OUTPATIENT)
Dept: FAMILY MEDICINE CLINIC | Age: 44
End: 2024-03-05
Payer: COMMERCIAL

## 2024-03-05 DIAGNOSIS — R73.03 PREDIABETES: Primary | ICD-10-CM

## 2024-03-05 NOTE — TELEPHONE ENCOUNTER
From: Alysa Qiu  To: Ann Melvin  Sent: 3/5/2024 8:07 AM EST  Subject: Lab work    I had my labs drawn Friday March 1st could / would you be able to add a hemaglobin A1C

## 2024-03-22 ENCOUNTER — HOSPITAL ENCOUNTER (OUTPATIENT)
Dept: MAMMOGRAPHY | Facility: HOSPITAL | Age: 44
Discharge: HOME | End: 2024-03-22
Admitting: FAMILY MEDICINE
Payer: COMMERCIAL

## 2024-03-22 DIAGNOSIS — Z12.31 VISIT FOR SCREENING MAMMOGRAM: ICD-10-CM

## 2024-03-22 PROCEDURE — 77063 BREAST TOMOSYNTHESIS BI: CPT

## 2024-03-22 PROCEDURE — 77067 SCR MAMMO BI INCL CAD: CPT

## 2024-04-08 ENCOUNTER — OFFICE VISIT (OUTPATIENT)
Dept: FAMILY MEDICINE CLINIC | Age: 44
End: 2024-04-08
Payer: COMMERCIAL

## 2024-04-08 VITALS
BODY MASS INDEX: 30.32 KG/M2 | WEIGHT: 193.2 LBS | HEART RATE: 68 BPM | DIASTOLIC BLOOD PRESSURE: 84 MMHG | TEMPERATURE: 98.2 F | HEIGHT: 67 IN | SYSTOLIC BLOOD PRESSURE: 130 MMHG

## 2024-04-08 DIAGNOSIS — F33.1 MAJOR DEPRESSIVE DISORDER, RECURRENT EPISODE, MODERATE DEGREE: ICD-10-CM

## 2024-04-08 DIAGNOSIS — M54.9 UPPER BACK PAIN ON RIGHT SIDE: Primary | ICD-10-CM

## 2024-04-08 PROCEDURE — 99214 OFFICE O/P EST MOD 30 MIN: CPT | Performed by: NURSE PRACTITIONER

## 2024-04-08 RX ORDER — BACLOFEN 10 MG/1
10 TABLET ORAL 3 TIMES DAILY PRN
Qty: 30 TABLET | Refills: 0 | Status: SHIPPED | OUTPATIENT
Start: 2024-04-08

## 2024-04-08 RX ORDER — ESTRADIOL 0.1 MG/G
CREAM VAGINAL
COMMUNITY
Start: 2024-03-19

## 2024-04-08 RX ORDER — ESCITALOPRAM OXALATE 10 MG/1
10 TABLET ORAL DAILY
Qty: 30 TABLET | Refills: 1 | Status: SHIPPED | OUTPATIENT
Start: 2024-04-08

## 2024-04-08 NOTE — Clinical Note
She is experiencing depression and is inquiring about going back on her Lexapro. I started her back on 10 mg and we talked that she may need a dose adjustment in several weeks; I think she was originally on 20 mg. It's hard month for her because of her son's passing and this is his bday month. She already has a f/u with you next month, which she's going to keep.

## 2024-04-08 NOTE — PROGRESS NOTES
"Chief Complaint  Spasms (Muscle spasms in upper right shoulder x 4 days. )    Subjective          Alysa Qiu presents to [facility]  History of Present Illness  She has left upper back pain. She went to the chiropractor and had adjustments but is now having right upper back pain. She reports that it was really bad on Friday and eased up some on Sunday. She has had Xrays through her chiropractor. Ibuprofen typically helps, but it was touching her pain this weekend.      Depression: She was on Lexapro for quite some time. She stopped the medication, as she didn't want to be on medication long-term.  She would like to go back on her medication, and she is interested starting at a lower dose.  This month is particular hard for her, as it is her son's birthday month, and he passed away in 2019.  She denies SI/HI.  She is interested in counseling.      Objective   Vital Signs:   /84 (BP Location: Left arm, Patient Position: Sitting, Cuff Size: Adult)   Pulse 68   Temp 98.2 °F (36.8 °C) (Oral)   Ht 170.2 cm (67.01\")   Wt 87.6 kg (193 lb 3.2 oz)   BMI 30.25 kg/m²     Physical Exam  Constitutional:       General: She is not in acute distress.     Appearance: Normal appearance. She is normal weight.   HENT:      Head: Normocephalic.   Eyes:      Pupils: Pupils are equal, round, and reactive to light.      Visual Fields: Right eye visual fields normal and left eye visual fields normal.   Neck:      Trachea: Trachea normal.   Cardiovascular:      Rate and Rhythm: Normal rate and regular rhythm.      Heart sounds: Normal heart sounds.   Pulmonary:      Effort: Pulmonary effort is normal.      Breath sounds: Normal breath sounds and air entry.   Musculoskeletal:         General: No swelling or tenderness (right upper back).      Right lower leg: No edema.      Left lower leg: No edema.   Skin:     General: Skin is warm and dry.   Neurological:      Mental Status: She is alert and " oriented to person, place, and time.   Psychiatric:         Mood and Affect: Mood and affect normal.         Behavior: Behavior normal.         Thought Content: Thought content normal.        Result Review :   The following data was reviewed by: YESY Vargas on 04/08/2024:                  Assessment and Plan    Diagnoses and all orders for this visit:    1. Upper back pain on right side (Primary)  -     baclofen (LIORESAL) 10 MG tablet; Take 1 tablet by mouth 3 (Three) Times a Day As Needed for Muscle Spasms.  Dispense: 30 tablet; Refill: 0    2. Major depressive disorder, recurrent episode, moderate degree  -     escitalopram (Lexapro) 10 MG tablet; Take 1 tablet by mouth Daily.  Dispense: 30 tablet; Refill: 1    As for her upper back pain, we will give her a trial of baclofen.  We have discussed that it may make her drowsy.    As for her depression, we will restart her on Lexapro at 10 mg daily.  I do recommend that she take a half a tablet for the first week and may take a full tablet after that.  We have discussed looking for counselors through [facility] or even using an willie.  She already has a follow-up with her PCP next month, which she is to keep.      Follow Up   Return for Next scheduled follow up.  Patient was given instructions and counseling regarding her condition or for health maintenance advice. Please see specific information pulled into the AVS if appropriate.

## 2024-04-11 ENCOUNTER — TELEPHONE (OUTPATIENT)
Dept: FAMILY MEDICINE CLINIC | Age: 44
End: 2024-04-11
Payer: COMMERCIAL

## 2024-06-07 ENCOUNTER — OFFICE VISIT (OUTPATIENT)
Dept: FAMILY MEDICINE CLINIC | Age: 44
End: 2024-06-07
Payer: COMMERCIAL

## 2024-06-07 VITALS
TEMPERATURE: 98.1 F | DIASTOLIC BLOOD PRESSURE: 79 MMHG | SYSTOLIC BLOOD PRESSURE: 122 MMHG | BODY MASS INDEX: 29.85 KG/M2 | WEIGHT: 190.2 LBS | HEART RATE: 62 BPM | HEIGHT: 67 IN

## 2024-06-07 DIAGNOSIS — E21.0 PRIMARY HYPERPARATHYROIDISM: ICD-10-CM

## 2024-06-07 DIAGNOSIS — F41.1 GAD (GENERALIZED ANXIETY DISORDER): ICD-10-CM

## 2024-06-07 DIAGNOSIS — Z15.89 HLA B27 (HLA B27 POSITIVE): ICD-10-CM

## 2024-06-07 DIAGNOSIS — F33.1 MAJOR DEPRESSIVE DISORDER, RECURRENT EPISODE, MODERATE DEGREE: Primary | ICD-10-CM

## 2024-06-07 DIAGNOSIS — E03.9 ACQUIRED HYPOTHYROIDISM: ICD-10-CM

## 2024-06-07 DIAGNOSIS — K21.9 GASTROESOPHAGEAL REFLUX DISEASE WITHOUT ESOPHAGITIS: ICD-10-CM

## 2024-06-07 PROCEDURE — 99214 OFFICE O/P EST MOD 30 MIN: CPT | Performed by: FAMILY MEDICINE

## 2024-06-07 RX ORDER — TRIAMCINOLONE ACETONIDE 1 MG/G
1 CREAM TOPICAL 2 TIMES DAILY
COMMUNITY
Start: 2023-09-27 | End: 2024-09-26

## 2024-06-07 RX ORDER — ESCITALOPRAM OXALATE 10 MG/1
10 TABLET ORAL DAILY
Qty: 90 TABLET | Refills: 1 | Status: SHIPPED | OUTPATIENT
Start: 2024-06-07

## 2024-06-07 NOTE — ASSESSMENT & PLAN NOTE
Following with Endocrinology.  Currently stable on levothyroxine 150 mcg daily.  No refills needed.

## 2024-06-07 NOTE — PROGRESS NOTES
"Chief Complaint  Depression (3 mth f/u)    Subjective          Alysa Qiu presents to [facility] today for routine follow-up.     She is due for colonoscopy.  She is due for Tdap.    She is on bupropion via for depression and anxiety.  She has been contemplating going back on the escitalopram.  \"I just don't know what to do about my mental health.\"  She has followed with Joaquina Chacon and Dr. Bettencourt [facility] in the past.  He had suggested Abilify but she was hesitant to go on an antipsychotic.  She is interested in alternative treatments, but she has elected not to pursue that for now.  She has also done therapy previously with both Hesham Grimm and Jamel Sánchez.  She had to stop due to financial barriers.  She has also gone to [facility] for grief counseling, a group that focuses on parents who have lost children.  Not currently seeing them.  She is short on time with her increased workload at her job.  She has previously been on Lexapro (ineffective), Prozac (remotely), Effexor (\"snappy brain\"), sertraline (sedation).  She is on trazodone for sleep.  That is working fairly well.    She is on levothyroxine for hypothyroidism.  She follows with Dr. Cotton (just switched from Dr. Lord) Endocrinology who manages the levothyroxine.  She is also following with him for primary hyperparathyroidism.  S    She is on spironolactone for acne.  Acne is well controlled.     She is on pantoprazole for GERD.  She has sucralfate that she uses as needed.  No indigestion or reflux.    She has a lot of diffuse arthralgias for which she was using muscle relaxer.  She is HLA B27 positive.        Current Outpatient Medications:   •  albuterol sulfate  (90 Base) MCG/ACT inhaler, Inhale 2 puffs Every 4 (Four) Hours As Needed for Wheezing., Disp: 18 g, Rfl: 1  •  baclofen (LIORESAL) 10 MG tablet, Take 1 tablet by mouth 3 (Three) Times a Day As Needed for Muscle Spasms., Disp: 30 " "tablet, Rfl: 0  •  buPROPion XL (WELLBUTRIN XL) 300 MG 24 hr tablet, Take 1 tablet by mouth Daily., Disp: 90 tablet, Rfl: 1  •  docusate sodium (COLACE) 100 MG capsule, Take 1 capsule by mouth 2 (Two) Times a Day As Needed for Constipation., Disp: 60 capsule, Rfl: 5  •  escitalopram (Lexapro) 10 MG tablet, Take 1 tablet by mouth Daily., Disp: 90 tablet, Rfl: 1  •  estradiol (ESTRACE) 0.1 MG/GM vaginal cream, , Disp: , Rfl:   •  levothyroxine (SYNTHROID, LEVOTHROID) 150 MCG tablet, Take 1 tablet by mouth Daily., Disp: , Rfl:   •  pantoprazole (PROTONIX) 40 MG EC tablet, Take 1 tablet by mouth Daily., Disp: 90 tablet, Rfl: 1  •  spironolactone (ALDACTONE) 100 MG tablet, Take 1 tablet by mouth Daily., Disp: , Rfl:   •  sucralfate (CARAFATE) 1 g tablet, Take 1 tablet by mouth 2 (Two) Times a Day As Needed (abdominal pain). On an empty stomach, Disp: 60 tablet, Rfl: 0  •  traZODone (DESYREL) 50 MG tablet, Take 0.5 tablets by mouth Every Night. Indications: Major Depressive Disorder, Trouble Sleeping, Disp: 30 tablet, Rfl: 1  •  triamcinolone (KENALOG) 0.1 % cream, Apply 1 Application topically to the appropriate area as directed 2 (Two) Times a Day., Disp: , Rfl:   Medications Discontinued During This Encounter   Medication Reason   • fluconazole (DIFLUCAN) 200 MG tablet Patient Reported Not Taking   • escitalopram (Lexapro) 10 MG tablet Patient Reported Not Taking           Allergies:  Tamiflu [oseltamivir]      Objective   Vital Signs:   Vitals:    06/07/24 0802   BP: 122/79   BP Location: Left arm   Patient Position: Sitting   Pulse: 62   Temp: 98.1 °F (36.7 °C)   TempSrc: Oral   Weight: 86.3 kg (190 lb 3.2 oz)   Height: 170.2 cm (67.01\")         Physical Exam  Constitutional:       Appearance: Normal appearance.   HENT:      Head: Normocephalic and atraumatic.   Eyes:      Extraocular Movements: Extraocular movements intact.      Conjunctiva/sclera: Conjunctivae normal.   Pulmonary:      Effort: Pulmonary effort is " "normal. No respiratory distress.   Musculoskeletal:         General: Normal range of motion.   Skin:     General: Skin is warm and dry.   Neurological:      General: No focal deficit present.      Mental Status: She is alert and oriented to person, place, and time.   Psychiatric:         Mood and Affect: Mood normal.         Behavior: Behavior normal.         Thought Content: Thought content normal.         Judgment: Judgment normal.         Lab Results   Component Value Date    GLUCOSE 100 (H) 03/01/2024    BUN 8 03/01/2024    CREATININE 1.14 (H) 03/01/2024    EGFRIFNONA 68 08/18/2021    EGFRIFAFRI 83 08/18/2021    BCR 7.0 03/01/2024    K 4.1 03/01/2024    CO2 26.0 03/01/2024    CALCIUM 10.3 03/01/2024    ALBUMIN 4.1 03/01/2024    LABIL2 1.3 (L) 07/28/2020    AST 14 03/01/2024    ALT 19 03/01/2024       Lab Results   Component Value Date    CHOL 208 (H) 03/01/2024    CHLPL 238 (H) 07/28/2020    TRIG 127 03/01/2024    HDL 37 (L) 03/01/2024     (H) 03/01/2024     Lab Results   Component Value Date    WBC 5.87 03/01/2024    HGB 12.0 03/01/2024    HCT 36.9 03/01/2024    MCV 84.4 03/01/2024     03/01/2024            Assessment and Plan    Assessment & Plan  Major depressive disorder, recurrent episode, moderate degree   she continues to struggle with her mental health.  Currently on bupropion 300 mg daily.  She is considering going back on the escitalopram 10 mg daily together with this.  I think this would be fine.  She notes that the escitalopram has not been particularly helpful in the past on its own, \"but at least I know it will not make things worse.\"  It is possible she may get more of a synergistic effect from being on both meds at once.  Referral back to Psychiatry offered and declined.  Continue to monitor closely.  I will see her back in 3 months or sooner as needed.   JEANNETTE (generalized anxiety disorder)    As per depression plan.  Acquired hypothyroidism  Following with Endocrinology.  Currently " stable on levothyroxine 150 mcg daily.  No refills needed.  Primary hyperparathyroidism  She is due for a BMP to recheck calcium level.  We also have an A1c pending for her.  Gastroesophageal reflux disease without esophagitis  Stable on pantoprazole 40 mg daily.  Refills were not needed today.  Continue to monitor.  Wean PPI as able.   HLA B27 (HLA B27 positive)  Using muscle relaxers as needed.    Orders Placed This Encounter   Procedures   • Basic Metabolic Panel     New Medications Ordered This Visit   Medications   • escitalopram (Lexapro) 10 MG tablet     Sig: Take 1 tablet by mouth Daily.     Dispense:  90 tablet     Refill:  1             Follow Up   Return in about 3 months (around 9/7/2024) for Annual physical.  Patient was given instructions and counseling regarding her condition or for health maintenance advice. Please see specific information pulled into the AVS if appropriate.           10/13/2023

## 2024-06-07 NOTE — ASSESSMENT & PLAN NOTE
" she continues to struggle with her mental health.  Currently on bupropion 300 mg daily.  She is considering going back on the escitalopram 10 mg daily together with this.  I think this would be fine.  She notes that the escitalopram has not been particularly helpful in the past on its own, \"but at least I know it will not make things worse.\"  It is possible she may get more of a synergistic effect from being on both meds at once.  Referral back to Psychiatry offered and declined.  Continue to monitor closely.  I will see her back in 3 months or sooner as needed.   "

## 2024-06-17 DIAGNOSIS — F33.1 MAJOR DEPRESSIVE DISORDER, RECURRENT EPISODE, MODERATE DEGREE: ICD-10-CM

## 2024-06-17 RX ORDER — ESCITALOPRAM OXALATE 10 MG/1
10 TABLET ORAL DAILY
Qty: 30 TABLET | OUTPATIENT
Start: 2024-06-17

## 2024-07-17 ENCOUNTER — TELEPHONE (OUTPATIENT)
Dept: FAMILY MEDICINE CLINIC | Age: 44
End: 2024-07-17
Payer: COMMERCIAL

## 2024-08-21 RX ORDER — ERGOCALCIFEROL 1.25 MG/1
50000 CAPSULE ORAL
Qty: 12 CAPSULE | Refills: 1 | OUTPATIENT
Start: 2024-08-21

## 2024-08-26 RX ORDER — ERGOCALCIFEROL 1.25 MG/1
50000 CAPSULE, LIQUID FILLED ORAL
Qty: 12 CAPSULE | Refills: 1 | OUTPATIENT
Start: 2024-08-26

## 2024-09-03 ENCOUNTER — LAB (OUTPATIENT)
Dept: LAB | Facility: HOSPITAL | Age: 44
End: 2024-09-03
Payer: COMMERCIAL

## 2024-09-03 ENCOUNTER — TRANSCRIBE ORDERS (OUTPATIENT)
Dept: LAB | Facility: HOSPITAL | Age: 44
End: 2024-09-03
Payer: COMMERCIAL

## 2024-09-03 DIAGNOSIS — E55.9 VITAMIN D DEFICIENCY: ICD-10-CM

## 2024-09-03 DIAGNOSIS — E21.0 PRIMARY HYPERPARATHYROIDISM: ICD-10-CM

## 2024-09-03 DIAGNOSIS — R73.03 PREDIABETES: ICD-10-CM

## 2024-09-03 DIAGNOSIS — E83.52 HYPERCALCEMIA: ICD-10-CM

## 2024-09-03 DIAGNOSIS — E21.0 PRIMARY HYPERPARATHYROIDISM: Primary | ICD-10-CM

## 2024-09-03 LAB
25(OH)D3 SERPL-MCNC: 39.1 NG/ML (ref 30–100)
ALBUMIN SERPL-MCNC: 4.2 G/DL (ref 3.5–5.2)
ALBUMIN/GLOB SERPL: 1.5 G/DL
ALP SERPL-CCNC: 72 U/L (ref 39–117)
ALT SERPL W P-5'-P-CCNC: 23 U/L (ref 1–33)
ANION GAP SERPL CALCULATED.3IONS-SCNC: 8.8 MMOL/L (ref 5–15)
AST SERPL-CCNC: 16 U/L (ref 1–32)
BILIRUB SERPL-MCNC: 0.3 MG/DL (ref 0–1.2)
BUN SERPL-MCNC: 9 MG/DL (ref 6–20)
BUN/CREAT SERPL: 7.4 (ref 7–25)
CA-I SERPL ISE-MCNC: 1.5 MMOL/L (ref 1.15–1.35)
CALCIUM SPEC-SCNC: 11.3 MG/DL (ref 8.6–10.5)
CHLORIDE SERPL-SCNC: 105 MMOL/L (ref 98–107)
CO2 SERPL-SCNC: 27.2 MMOL/L (ref 22–29)
CREAT SERPL-MCNC: 1.22 MG/DL (ref 0.57–1)
EGFRCR SERPLBLD CKD-EPI 2021: 55.5 ML/MIN/1.73
GLOBULIN UR ELPH-MCNC: 2.8 GM/DL
GLUCOSE SERPL-MCNC: 108 MG/DL (ref 65–99)
HBA1C MFR BLD: 5.8 % (ref 4.8–5.6)
PHOSPHATE SERPL-MCNC: 3.4 MG/DL (ref 2.5–4.5)
POTASSIUM SERPL-SCNC: 3.8 MMOL/L (ref 3.5–5.2)
PROT SERPL-MCNC: 7 G/DL (ref 6–8.5)
PTH-INTACT SERPL-MCNC: 75.9 PG/ML (ref 15–65)
SODIUM SERPL-SCNC: 141 MMOL/L (ref 136–145)

## 2024-09-03 PROCEDURE — 36415 COLL VENOUS BLD VENIPUNCTURE: CPT

## 2024-09-03 PROCEDURE — 82330 ASSAY OF CALCIUM: CPT

## 2024-09-03 PROCEDURE — 83036 HEMOGLOBIN GLYCOSYLATED A1C: CPT

## 2024-09-03 PROCEDURE — 82306 VITAMIN D 25 HYDROXY: CPT

## 2024-09-03 PROCEDURE — 84100 ASSAY OF PHOSPHORUS: CPT

## 2024-09-03 PROCEDURE — 80053 COMPREHEN METABOLIC PANEL: CPT

## 2024-09-03 PROCEDURE — 83970 ASSAY OF PARATHORMONE: CPT

## 2024-09-06 ENCOUNTER — OFFICE VISIT (OUTPATIENT)
Dept: FAMILY MEDICINE CLINIC | Age: 44
End: 2024-09-06
Payer: COMMERCIAL

## 2024-09-06 VITALS
HEART RATE: 68 BPM | WEIGHT: 190 LBS | DIASTOLIC BLOOD PRESSURE: 87 MMHG | TEMPERATURE: 98.2 F | HEIGHT: 67 IN | BODY MASS INDEX: 29.82 KG/M2 | SYSTOLIC BLOOD PRESSURE: 137 MMHG | OXYGEN SATURATION: 98 %

## 2024-09-06 DIAGNOSIS — Z00.00 ANNUAL PHYSICAL EXAM: Primary | ICD-10-CM

## 2024-09-06 DIAGNOSIS — F33.1 MAJOR DEPRESSIVE DISORDER, RECURRENT EPISODE, MODERATE DEGREE: ICD-10-CM

## 2024-09-06 DIAGNOSIS — E03.9 ACQUIRED HYPOTHYROIDISM: ICD-10-CM

## 2024-09-06 DIAGNOSIS — Z15.89 HLA B27 (HLA B27 POSITIVE): ICD-10-CM

## 2024-09-06 DIAGNOSIS — Z12.11 SCREENING FOR COLON CANCER: ICD-10-CM

## 2024-09-06 DIAGNOSIS — F41.1 GAD (GENERALIZED ANXIETY DISORDER): ICD-10-CM

## 2024-09-06 PROCEDURE — 99396 PREV VISIT EST AGE 40-64: CPT | Performed by: FAMILY MEDICINE

## 2024-09-06 NOTE — PROGRESS NOTES
"Chief Complaint  Annual Exam    Subjective          Alysa iQu presents to [facility] today for her annual physical.      She is due for colonoscopy.  She is UTD on Pap smear, last done 1/2022 and this showed NIL with negative high risk HPV.  Five-year repeat recommended.  She is UTD on mammogram, last done 3/2024 and this was normal.  She is due for COVID, Td, flu.  She is UTD on routine labs including CBC, CMP and lipids.     She is on bupropion and escitalopram for depression and anxiety.  She has followed with Joaquina Chacon and Dr. Bettencourt [facility] in the past.  He had suggested Abilify but she was hesitant to try an antipsychotic.  She is interested in alternative treatments, but she has elected not to pursue that for now.  She has also done therapy previously but had to stop due to cost.  She has gone to grief counseling for parents who have lost children in the past but is not  currently seeing them.  She has previously been on Prozac (remotely), Effexor (\"snappy brain\"), sertraline (sedation).  She is not really using the trazodone for insomnia.  She has been using marijuana gummies instead.    She is on levothyroxine for hypothyroidism.  She is following with Dr. Cotton Endocrinology who manages levothyroxine.  She is also following with him for primary hyperparathyroidism.  They have discussed surgery but she has been hesitant to proceed with that.      She is on spironolactone for acne.  Acne is well controlled.     She is on pantoprazole for GERD.  She has sucralfate that she uses as needed.  Denies heartburn or indigestion.    She has a lot of diffuse arthralgias for which she was using muscle relaxer.  She is HLA B27 positive.      Review of Systems   Constitutional:  Negative for chills, fatigue and fever.   HENT:  Positive for ear pain. Negative for congestion, hearing loss and rhinorrhea.    Eyes:  Negative for pain and visual disturbance.   Respiratory:  " Negative for cough and shortness of breath.    Cardiovascular:  Negative for chest pain and palpitations.   Gastrointestinal:  Negative for abdominal pain, constipation, diarrhea, nausea and vomiting.   Genitourinary:  Negative for dysuria and hematuria.   Musculoskeletal:  Positive for neck pain. Negative for arthralgias and myalgias.   Skin:  Negative for rash.   Neurological:  Negative for weakness and numbness.   Psychiatric/Behavioral:  Positive for dysphoric mood. Negative for sleep disturbance. The patient is nervous/anxious.          Current Outpatient Medications:     albuterol sulfate  (90 Base) MCG/ACT inhaler, Inhale 2 puffs Every 4 (Four) Hours As Needed for Wheezing., Disp: 18 g, Rfl: 1    baclofen (LIORESAL) 10 MG tablet, Take 1 tablet by mouth 3 (Three) Times a Day As Needed for Muscle Spasms., Disp: 30 tablet, Rfl: 0    buPROPion XL (WELLBUTRIN XL) 300 MG 24 hr tablet, Take 1 tablet by mouth Daily., Disp: 90 tablet, Rfl: 1    escitalopram (Lexapro) 10 MG tablet, Take 1 tablet by mouth Daily., Disp: 90 tablet, Rfl: 1    estradiol (ESTRACE) 0.1 MG/GM vaginal cream, , Disp: , Rfl:     levothyroxine (SYNTHROID, LEVOTHROID) 150 MCG tablet, Take 1 tablet by mouth Daily., Disp: , Rfl:     pantoprazole (PROTONIX) 40 MG EC tablet, Take 1 tablet by mouth Daily., Disp: 90 tablet, Rfl: 1    spironolactone (ALDACTONE) 100 MG tablet, Take 1 tablet by mouth Daily., Disp: , Rfl:     sucralfate (CARAFATE) 1 g tablet, Take 1 tablet by mouth 2 (Two) Times a Day As Needed (abdominal pain). On an empty stomach, Disp: 60 tablet, Rfl: 0    traZODone (DESYREL) 50 MG tablet, Take 0.5 tablets by mouth Every Night. Indications: Major Depressive Disorder, Trouble Sleeping, Disp: 30 tablet, Rfl: 1    Allergies:  Tamiflu [oseltamivir]      Objective   Vital Signs:   Vitals:    09/06/24 0918 09/06/24 1004   BP: 144/86 137/87   BP Location: Right arm    Patient Position: Sitting    Cuff Size: Adult    Pulse: 70 68   Temp:  "98.2 °F (36.8 °C)    TempSrc: Oral    SpO2: 98%    Weight: 86.2 kg (190 lb)    Height: 170.2 cm (67.01\")      Body mass index is 29.75 kg/m².         Physical Exam  Vitals reviewed.   Constitutional:       General: She is not in acute distress.     Appearance: Normal appearance. She is well-developed.   HENT:      Head: Normocephalic and atraumatic.      Right Ear: External ear normal.      Left Ear: External ear normal.      Mouth/Throat:      Mouth: Mucous membranes are moist.   Eyes:      Extraocular Movements: Extraocular movements intact.      Conjunctiva/sclera: Conjunctivae normal.      Pupils: Pupils are equal, round, and reactive to light.   Cardiovascular:      Rate and Rhythm: Normal rate and regular rhythm.      Heart sounds: No murmur heard.  Pulmonary:      Effort: Pulmonary effort is normal.      Breath sounds: Normal breath sounds. No wheezing, rhonchi or rales.   Abdominal:      General: Bowel sounds are normal. There is no distension.      Palpations: Abdomen is soft.      Tenderness: There is no abdominal tenderness.   Musculoskeletal:         General: Normal range of motion.   Skin:     General: Skin is warm and dry.   Neurological:      Mental Status: She is alert and oriented to person, place, and time.      Deep Tendon Reflexes: Reflexes normal.   Psychiatric:         Mood and Affect: Mood and affect normal.         Behavior: Behavior normal.         Thought Content: Thought content normal.         Judgment: Judgment normal.          Lab Results   Component Value Date    GLUCOSE 108 (H) 09/03/2024    BUN 9 09/03/2024    CREATININE 1.22 (H) 09/03/2024    EGFR 55.5 (L) 09/03/2024    BCR 7.4 09/03/2024    K 3.8 09/03/2024    CO2 27.2 09/03/2024    CALCIUM 11.3 (H) 09/03/2024    ALBUMIN 4.2 09/03/2024    BILITOT 0.3 09/03/2024    AST 16 09/03/2024    ALT 23 09/03/2024       Lab Results   Component Value Date    CHOL 208 (H) 03/01/2024    CHLPL 238 (H) 07/28/2020    TRIG 127 03/01/2024    HDL 37 " (L) 03/01/2024     (H) 03/01/2024       Lab Results   Component Value Date    WBC 5.87 03/01/2024    HGB 12.0 03/01/2024    HCT 36.9 03/01/2024    MCV 84.4 03/01/2024     03/01/2024            Assessment and Plan    Assessment & Plan  Annual physical exam  She is due for colonoscopy; referral placed.  She is UTD on Pap smear, last done 1/2022 and this showed NIL with negative high risk HPV.  Five-year repeat recommended.  She is UTD on mammogram, last done 3/2024 and this was normal.  She is due for COVID, Td, flu; declines all vaccines.  She is UTD on routine labs including CBC, CMP and lipids.  We are going to repeat BMP in 4 weeks.  Major depressive disorder, recurrent episode, moderate degree  Stable on bupropion 300 mg daily and escitalopram 10 mg daily.  Refills were needed today.  Continue to monitor.  JEANNETTE (generalized anxiety disorder)    As per depression plan.  Acquired hypothyroidism  Following with Endocrinology who is managing.  HLA B27 (HLA B27 positive)  Uncle with ankylosing spondylitis. Not currently symptomatic.   Screening for colon cancer      Orders Placed This Encounter   Procedures    Ambulatory Referral For Screening Colonoscopy              Follow Up   Return in about 3 months (around 12/6/2024) for Recheck.  Patient was given instructions and counseling regarding her condition or for health maintenance advice. Please see specific information pulled into the AVS if appropriate.         09/06/2024

## 2024-09-06 NOTE — ASSESSMENT & PLAN NOTE
Stable on bupropion 300 mg daily and escitalopram 10 mg daily.  Refills were needed today.  Continue to monitor.

## 2024-10-05 DIAGNOSIS — K59.09 CHRONIC CONSTIPATION: ICD-10-CM

## 2024-10-07 ENCOUNTER — TELEPHONE (OUTPATIENT)
Dept: FAMILY MEDICINE CLINIC | Age: 44
End: 2024-10-07
Payer: COMMERCIAL

## 2024-10-07 DIAGNOSIS — E83.52 HYPERCALCEMIA: Primary | ICD-10-CM

## 2024-10-07 NOTE — TELEPHONE ENCOUNTER
----- Message from Ann Melvin sent at 9/6/2024  9:47 AM EDT -----  Please call to have pt come in for labs (BMP, dx hypercalcemia).  Please pend order to me.  Thanks, SAROJ

## 2024-10-15 RX ORDER — DOCUSATE SODIUM 100 MG/1
CAPSULE, LIQUID FILLED ORAL
Qty: 60 CAPSULE | Refills: 5 | Status: SHIPPED | OUTPATIENT
Start: 2024-10-15

## 2024-10-15 RX ORDER — ERGOCALCIFEROL 1.25 MG/1
50000 CAPSULE, LIQUID FILLED ORAL
Qty: 12 CAPSULE | Refills: 1 | Status: SHIPPED | OUTPATIENT
Start: 2024-10-15

## 2024-10-25 ENCOUNTER — OFFICE VISIT (OUTPATIENT)
Dept: FAMILY MEDICINE CLINIC | Age: 44
End: 2024-10-25
Payer: COMMERCIAL

## 2024-10-25 VITALS
SYSTOLIC BLOOD PRESSURE: 124 MMHG | OXYGEN SATURATION: 99 % | HEIGHT: 67 IN | HEART RATE: 66 BPM | TEMPERATURE: 98.1 F | WEIGHT: 193.8 LBS | DIASTOLIC BLOOD PRESSURE: 81 MMHG | BODY MASS INDEX: 30.42 KG/M2

## 2024-10-25 DIAGNOSIS — F33.1 MAJOR DEPRESSIVE DISORDER, RECURRENT EPISODE, MODERATE DEGREE: Primary | ICD-10-CM

## 2024-10-25 PROCEDURE — 99214 OFFICE O/P EST MOD 30 MIN: CPT | Performed by: INTERNAL MEDICINE

## 2024-10-25 RX ORDER — FLUOXETINE 10 MG/1
10 CAPSULE ORAL DAILY
Qty: 30 CAPSULE | Refills: 2 | Status: SHIPPED | OUTPATIENT
Start: 2024-10-25

## 2024-10-25 NOTE — PROGRESS NOTES
"Chief Complaint  Medication Problem (Wants to discuss medication for depression )    Subjective      Alysa Qiu is a 44 y.o. female who presents to [facility]     Patient Care Team:  Ann Melvin MD as PCP - General (Family Medicine)  Joaquina Chacon APRN as Nurse Practitioner (Behavioral Health)  Ms. Qiu presents today to discuss worsening depression in the setting of the anniversary of her sons passing.  She has been experiencing worsening anxiety, insomnia, and feelings of grief.  She has been struggling with participating in her normal daily activities.  She has been suffering from depression for 5 years following her sons death. She has been seen by Behavioral health and has been in therapy off and on.  She is currently maintained on lexapro and wellbutrin.  She does not feel like these are working and that she has tried venlafaxine and sertraline in the past.  Currently she is experience side effects from her medication including sexual dysfunction and fatigue. She is interested in resuming therapy and seeking an evaluation to re-examine her current diagnosis.  She does report having impaired concentration, difficulty prioritizing,and perfectionism.      Review of Systems  Full review of systems obtained and pertinent positives states in above HPI.  Otherwise all others reviewed and are negative.    Objective   Vital Signs:   Vitals:    10/25/24 1044   BP: 124/81   BP Location: Right arm   Patient Position: Sitting   Cuff Size: Adult   Pulse: 66   Temp: 98.1 °F (36.7 °C)   TempSrc: Oral   SpO2: 99%   Weight: 87.9 kg (193 lb 12.8 oz)   Height: 170.2 cm (67.01\")     Body mass index is 30.35 kg/m².    Wt Readings from Last 3 Encounters:   10/25/24 87.9 kg (193 lb 12.8 oz)   09/06/24 86.2 kg (190 lb)   06/07/24 86.3 kg (190 lb 3.2 oz)     BP Readings from Last 3 Encounters:   10/25/24 124/81   09/06/24 137/87   06/07/24 122/79       Health Maintenance   Topic " Date Due    COLORECTAL CANCER SCREENING  Never done    INFLUENZA VACCINE  Never done    COVID-19 Vaccine (1 - 2023-24 season) Never done    TDAP/TD VACCINES (1 - Tdap) 06/07/2025 (Originally 7/10/1997)    BMI FOLLOWUP  02/16/2025    LIPID PANEL  03/01/2025    ANNUAL PHYSICAL  09/06/2025    MAMMOGRAM  03/22/2026    PAP SMEAR  01/28/2027    HEPATITIS C SCREENING  Completed    Pneumococcal Vaccine 0-64  Aged Out   What is the primary reason for your visit?: Depression  Depression (Submitted on 10/25/2024)  Chief Complaint: Depression  Visit: follow-up  Frequency: constantly  Severity: severe  confusion: No  dizziness: No  excessive worry: Yes  insomnia: Yes  irritability: Yes  malaise/fatigue: Yes  obsessions: Yes  hypersomnia: Yes  difficulty controlling mood: Yes  difficulty staying asleep: Yes  difficulty falling asleep: Yes  Hours of sleep per night: 5 Hours  Medication compliance: %  Side effects: fatigue, sexual problems, constipation/diarrhea  Aggravated by: social activities, work stress    Physical Exam   GEN:NAD, well nourished  HEENT:NCAT, PERRL, EOMI, OP clear, MMM  NECK:supple, no JVD, no carotid bruits  CVA:RRR s1, s2 no m/r/g  CHEST:CTA B no wheezes or rhonchi  ABD:soft, nontender, nondistended +bs  EXT:no c/c/e 2+PP B  NEURO:CN II-XII grossly nonfocal, no evidence of asterixes or tremor  PSYCH:  depressed appropriate mood and affect  :deferred  SKIN: warm, dry, intact, no lesions or rashes    Result Review   The following data was reviewed by: Virgilio Krause MD on 10/25/2024:  [x]  Tests & Results  []  Hospitalization/Emergency Department/Urgent Care  []  Internal/External Consultant Notes    Procedures          ASSESSMENT/PLAN  Diagnoses and all orders for this visit:    1. Major depressive disorder, recurrent episode, moderate degree (Primary)  Comments:  start fluoxetine 10mg, d/c lexapro  continue wellbutrin  she will call Dr. Pfeiffer with psychiatry in [place], [place]  consider group therapy  for child loss.    Other orders  -     FLUoxetine (PROzac) 10 MG capsule; Take 1 capsule by mouth Daily.  Dispense: 30 capsule; Refill: 2                FOLLOW UP  Return in about 12 weeks (around 1/17/2025).  Patient was given instructions and counseling regarding her condition or for health maintenance advice. Please see specific information pulled into the AVS if appropriate.       Virgilio Krause MD  10/25/24  11:34 EDT

## 2024-10-30 DIAGNOSIS — K21.9 GASTROESOPHAGEAL REFLUX DISEASE WITHOUT ESOPHAGITIS: ICD-10-CM

## 2024-10-31 RX ORDER — PANTOPRAZOLE SODIUM 40 MG/1
40 TABLET, DELAYED RELEASE ORAL DAILY
Qty: 30 TABLET | Refills: 0 | Status: SHIPPED | OUTPATIENT
Start: 2024-10-31

## 2024-11-08 ENCOUNTER — TELEPHONE (OUTPATIENT)
Dept: FAMILY MEDICINE CLINIC | Age: 44
End: 2024-11-08
Payer: COMMERCIAL

## 2024-11-25 ENCOUNTER — OFFICE VISIT (OUTPATIENT)
Dept: FAMILY MEDICINE CLINIC | Age: 44
End: 2024-11-25
Payer: COMMERCIAL

## 2024-11-25 VITALS
SYSTOLIC BLOOD PRESSURE: 119 MMHG | HEART RATE: 72 BPM | OXYGEN SATURATION: 98 % | DIASTOLIC BLOOD PRESSURE: 85 MMHG | BODY MASS INDEX: 30.26 KG/M2 | WEIGHT: 192.8 LBS | HEIGHT: 67 IN

## 2024-11-25 DIAGNOSIS — J01.10 ACUTE FRONTAL SINUSITIS, RECURRENCE NOT SPECIFIED: Primary | ICD-10-CM

## 2024-11-25 DIAGNOSIS — J02.9 SORE THROAT: ICD-10-CM

## 2024-11-25 LAB
EXPIRATION DATE: NORMAL
EXPIRATION DATE: NORMAL
FLUAV AG UPPER RESP QL IA.RAPID: NOT DETECTED
FLUBV AG UPPER RESP QL IA.RAPID: NOT DETECTED
INTERNAL CONTROL: NORMAL
INTERNAL CONTROL: NORMAL
Lab: NORMAL
Lab: NORMAL
S PYO AG THROAT QL: NEGATIVE
SARS-COV-2 AG UPPER RESP QL IA.RAPID: NOT DETECTED

## 2024-11-25 PROCEDURE — 87880 STREP A ASSAY W/OPTIC: CPT | Performed by: INTERNAL MEDICINE

## 2024-11-25 PROCEDURE — 87428 SARSCOV & INF VIR A&B AG IA: CPT | Performed by: INTERNAL MEDICINE

## 2024-11-25 PROCEDURE — 99214 OFFICE O/P EST MOD 30 MIN: CPT | Performed by: INTERNAL MEDICINE

## 2024-11-25 PROCEDURE — 87081 CULTURE SCREEN ONLY: CPT | Performed by: INTERNAL MEDICINE

## 2024-11-25 RX ORDER — FLUCONAZOLE 200 MG/1
200 TABLET ORAL ONCE
COMMUNITY
Start: 2024-10-28

## 2024-11-25 RX ORDER — FLUTICASONE PROPIONATE 50 MCG
2 SPRAY, SUSPENSION (ML) NASAL DAILY
Qty: 16 G | Refills: 0 | Status: SHIPPED | OUTPATIENT
Start: 2024-11-25

## 2024-11-25 RX ORDER — CLOBETASOL PROPIONATE 0.5 MG/G
1 CREAM TOPICAL 2 TIMES DAILY
COMMUNITY
Start: 2024-10-29

## 2024-11-25 NOTE — PROGRESS NOTES
"Chief Complaint  Cough, Fever, and URI    Subjective      Alysa Qiu is a 44 y.o. female who presents to [facility]     Patient Care Team:  Virgilio Krause MD as PCP - General (Internal Medicine)  Joaquina Chacon APRN as Nurse Practitioner (Behavioral Health)  Patient presents to same-day clinic with chief complaint of cough fever and upper respiratory symptoms.  Symptoms have been ongoing for 5 days.  Her flu and COVID swab are negative in the clinic.  Her cough is minimally productive.  She also has complaints of sore throat.  Her rapid strep a test was negative.    Review of Systems  Full review of systems obtained and pertinent positives states in above HPI.  Otherwise all others reviewed and are negative.    Objective   Vital Signs:   Vitals:    11/25/24 0927   BP: 119/85   BP Location: Right arm   Patient Position: Sitting   Cuff Size: Adult   Pulse: 72   SpO2: 98%   Weight: 87.5 kg (192 lb 12.8 oz)   Height: 170.2 cm (67.01\")     Body mass index is 30.19 kg/m².    Wt Readings from Last 3 Encounters:   11/25/24 87.5 kg (192 lb 12.8 oz)   10/25/24 87.9 kg (193 lb 12.8 oz)   09/06/24 86.2 kg (190 lb)     BP Readings from Last 3 Encounters:   11/25/24 119/85   10/25/24 124/81   09/06/24 137/87       Health Maintenance   Topic Date Due    COLORECTAL CANCER SCREENING  Never done    COVID-19 Vaccine (1 - 2024-25 season) 11/27/2024 (Originally 9/1/2024)    INFLUENZA VACCINE  03/31/2025 (Originally 8/1/2024)    TDAP/TD VACCINES (1 - Tdap) 06/07/2025 (Originally 7/10/1997)    BMI FOLLOWUP  02/16/2025    LIPID PANEL  03/01/2025    ANNUAL PHYSICAL  09/06/2025    MAMMOGRAM  03/22/2026    PAP SMEAR  01/28/2027    HEPATITIS C SCREENING  Completed    Pneumococcal Vaccine 0-64  Aged Out       Physical Exam   GEN:NAD, well nourished  HEENT:NCAT, PERRL, EOMI, OP with erythema, MMM, tympanic membranes bulging with fluid  NECK:supple, no JVD, no carotid bruits  CVA:RRR s1, s2 no " m/r/g  CHEST:CTA B no wheezes or rhonchi  ABD:soft, nontender, nondistended +bs  EXT:no c/c/e 2+PP B  NEURO:CN II-XII grossly nonfocal, no evidence of asterixes or tremor  PSYCH: appropriate mood and affect  :deferred  SKIN: warm, dry, intact, no lesions or rashes    Result Review   The following data was reviewed by: Virgilio Krause MD on 11/25/2024:  [x]  Tests & Results  []  Hospitalization/Emergency Department/Urgent Care  []  Internal/External Consultant Notes    Procedures          ASSESSMENT/PLAN  Diagnoses and all orders for this visit:    1. Acute frontal sinusitis, recurrence not specified (Primary)  Comments:  Amoxicillin clavulanic acid 875 mg twice daily x 10 days  Flonase 2 sprays each nares while on antibiotic  Cetirizine 10 mg p.o. daily  Orders:  -     POCT SARS-CoV-2 Antigen STAN + Flu  -     POCT rapid strep A    2. Sore throat  Comments:  Rapid strep A-.  Will follow-up on hemolytic beta strep culture  Orders:  -     POCT SARS-CoV-2 Antigen STAN + Flu  -     POCT rapid strep A  -     Beta Strep Culture, Throat - , Throat; Future  -     Beta Strep Culture, Throat - Swab, Throat    Other orders  -     amoxicillin-clavulanate (AUGMENTIN) 875-125 MG per tablet; Take 1 tablet by mouth 2 (Two) Times a Day for 10 days.  Dispense: 20 tablet; Refill: 0  -     fluticasone (FLONASE) 50 MCG/ACT nasal spray; Administer 2 sprays into the nostril(s) as directed by provider Daily.  Dispense: 16 g; Refill: 0           FOLLOW UP  No follow-ups on file.  Patient was given instructions and counseling regarding her condition or for health maintenance advice. Please see specific information pulled into the AVS if appropriate.       Virgilio Krause MD  11/25/24  17:36 EST

## 2024-11-27 LAB — BACTERIA SPEC AEROBE CULT: NORMAL

## 2024-12-13 DIAGNOSIS — K21.9 GASTROESOPHAGEAL REFLUX DISEASE WITHOUT ESOPHAGITIS: ICD-10-CM

## 2024-12-13 RX ORDER — PANTOPRAZOLE SODIUM 40 MG/1
40 TABLET, DELAYED RELEASE ORAL DAILY
Qty: 30 TABLET | Refills: 0 | Status: SHIPPED | OUTPATIENT
Start: 2024-12-13

## 2025-01-15 DIAGNOSIS — F33.1 MAJOR DEPRESSIVE DISORDER, RECURRENT EPISODE, MODERATE DEGREE: ICD-10-CM

## 2025-01-15 DIAGNOSIS — K21.9 GASTROESOPHAGEAL REFLUX DISEASE WITHOUT ESOPHAGITIS: ICD-10-CM

## 2025-01-15 RX ORDER — BUPROPION HYDROCHLORIDE 300 MG/1
300 TABLET ORAL DAILY
Qty: 90 TABLET | Refills: 2 | Status: SHIPPED | OUTPATIENT
Start: 2025-01-15

## 2025-01-15 RX ORDER — PANTOPRAZOLE SODIUM 40 MG/1
40 TABLET, DELAYED RELEASE ORAL DAILY
Qty: 30 TABLET | Refills: 0 | Status: SHIPPED | OUTPATIENT
Start: 2025-01-15

## 2025-01-17 ENCOUNTER — OFFICE VISIT (OUTPATIENT)
Dept: FAMILY MEDICINE CLINIC | Age: 45
End: 2025-01-17
Payer: COMMERCIAL

## 2025-01-17 VITALS
BODY MASS INDEX: 30.59 KG/M2 | SYSTOLIC BLOOD PRESSURE: 110 MMHG | HEART RATE: 72 BPM | TEMPERATURE: 97.8 F | HEIGHT: 67 IN | DIASTOLIC BLOOD PRESSURE: 61 MMHG | WEIGHT: 194.9 LBS | OXYGEN SATURATION: 98 %

## 2025-01-17 DIAGNOSIS — F41.8 SITUATIONAL ANXIETY: Primary | ICD-10-CM

## 2025-01-17 DIAGNOSIS — F90.2 ATTENTION DEFICIT HYPERACTIVITY DISORDER (ADHD), COMBINED TYPE: ICD-10-CM

## 2025-01-17 DIAGNOSIS — E21.0 PRIMARY HYPERPARATHYROIDISM: ICD-10-CM

## 2025-01-17 DIAGNOSIS — E03.9 ACQUIRED HYPOTHYROIDISM: ICD-10-CM

## 2025-01-17 DIAGNOSIS — E55.9 VITAMIN D DEFICIENCY: ICD-10-CM

## 2025-01-17 PROCEDURE — 99214 OFFICE O/P EST MOD 30 MIN: CPT | Performed by: INTERNAL MEDICINE

## 2025-01-17 RX ORDER — DIAZEPAM 10 MG/1
10 TABLET ORAL AS NEEDED
Qty: 4 TABLET | Refills: 0 | Status: SHIPPED | OUTPATIENT
Start: 2025-01-17

## 2025-01-17 RX ORDER — DEXTROAMPHETAMINE SACCHARATE, AMPHETAMINE ASPARTATE MONOHYDRATE, DEXTROAMPHETAMINE SULFATE AND AMPHETAMINE SULFATE 6.25; 6.25; 6.25; 6.25 MG/1; MG/1; MG/1; MG/1
25 CAPSULE, EXTENDED RELEASE ORAL EVERY MORNING
COMMUNITY
Start: 2025-01-16

## 2025-01-17 NOTE — PROGRESS NOTES
"Chief Complaint  Depression (4 mo f/u ) and Med Refill (Needs refill on trazodone )    Subjective      Alysa Qiu is a 44 y.o. female who presents to [facility]     Patient Care Team:  Virgilio Krause MD as PCP - General (Internal Medicine)  Joaquina Chacon APRN as Nurse Practitioner (Behavioral Health)  Patient presents for follow up regarding major depressive disorder and hypercalcemia with concern for primary hyperparathyroidism.  Alysa reports that she was seen by  Dr. Cotton with endocrinology at [facility].  She was able to complete part of the sestamibi scan that was administered to evaluate for possible pituitary adenoma.  She complained and becoming diaphoretic with shortness of air and a intense panic and could not be placed in a small enclosed area for 40 minutes.  She does believe that if she had something to help with the anxiety that she would be able to complete the test.    She has found a new psychiatrist and began treatment for ADHD.    Review of Systems  Full review of systems obtained and pertinent positives states in above HPI.  Otherwise all others reviewed and are negative.    Objective   Vital Signs:   Vitals:    01/17/25 0928   BP: 110/61   BP Location: Right arm   Patient Position: Sitting   Cuff Size: Adult   Pulse: 72   Temp: 97.8 °F (36.6 °C)   TempSrc: Temporal   SpO2: 98%   Weight: 88.4 kg (194 lb 14.4 oz)   Height: 170.2 cm (67.01\")     Body mass index is 30.52 kg/m².    Wt Readings from Last 3 Encounters:   01/17/25 88.4 kg (194 lb 14.4 oz)   11/25/24 87.5 kg (192 lb 12.8 oz)   10/25/24 87.9 kg (193 lb 12.8 oz)     BP Readings from Last 3 Encounters:   01/17/25 110/61   11/25/24 119/85   10/25/24 124/81       Health Maintenance   Topic Date Due    COLORECTAL CANCER SCREENING  Never done    COVID-19 Vaccine (1 - 2024-25 season) 01/19/2025 (Originally 9/1/2024)    INFLUENZA VACCINE  03/31/2025 (Originally 7/1/2024)    TDAP/TD VACCINES (1 - Tdap) " 06/07/2025 (Originally 7/10/1997)    BMI FOLLOWUP  02/16/2025    LIPID PANEL  03/01/2025    ANNUAL PHYSICAL  09/06/2025    MAMMOGRAM  03/22/2026    PAP SMEAR  01/28/2027    HEPATITIS C SCREENING  Completed    Pneumococcal Vaccine 0-64  Aged Out       Physical Exam   GEN:NAD, well nourished  HEENT:NCAT, PERRL, EOMI, OP clear, MMM  NECK:supple, no JVD, no carotid bruits  CVA:RRR s1, s2 no m/r/g  CHEST:CTA B no wheezes or rhonchi  ABD:soft, nontender, nondistended +bs  EXT:no c/c/e 2+PP B  NEURO:CN II-XII grossly nonfocal, no evidence of asterixes or tremor  PSYCH: appropriate mood and affect  :deferred  SKIN: warm, dry, intact, no lesions or rashes      Result Review   The following data was reviewed by: Virgilio Krause MD on 01/17/2025:  [x]  Tests & Results  []  Hospitalization/Emergency Department/Urgent Care  []  Internal/External Consultant Notes    Procedures          ASSESSMENT/PLAN  Diagnoses and all orders for this visit:    1. Situational anxiety (Primary)  Comments:  patient with claustraphobia  valium 10mg po 30 minutes prior to procedure. (sestamibi scan, NM paraythyroid scan)  Orders:  -     diazePAM (Valium) 10 MG tablet; Take 1 tablet by mouth As Needed for Anxiety.  Dispense: 4 tablet; Refill: 0    2. Primary hyperparathyroidism  Comments:  follow up with Dr. Cotton with endocrinology.  Unable to complete NM scan due to claustraphobia.  I have reordered and will use prn valium as anxiolytic  Orders:  -     NM Parathyroid Scan; Future  -     Ambulatory Referral to ENT (Otolaryngology)    3. Vitamin D deficiency  Comments:  check vitamin D level  goal is >30 <50.  Orders:  -     Basic metabolic panel  -     Vitamin D 25 hydroxy    4. Acquired hypothyroidism  Comments:  check TSH and free T4  continue levothyroxine  Orders:  -     TSH+Free T4    5. Attention deficit hyperactivity disorder (ADHD), combined type  Comments:  new psychiatrist Dr. Pfeiffer  continue Adderral as prescribed.                 Alysa Qiu  reports that she quit smoking about 16 years ago. Her smoking use included cigarettes. She has been exposed to tobacco smoke. She has never used smokeless tobacco. I advised her to quit and she is .             FOLLOW UP  5/9/2025  Patient was given instructions and counseling regarding her condition or for health maintenance advice. Please see specific information pulled into the AVS if appropriate.       Virgilio Krause MD  01/17/25  09:51 EST

## 2025-02-05 ENCOUNTER — TRANSCRIBE ORDERS (OUTPATIENT)
Dept: ADMINISTRATIVE | Facility: HOSPITAL | Age: 45
End: 2025-02-05
Payer: COMMERCIAL

## 2025-02-05 ENCOUNTER — LAB (OUTPATIENT)
Dept: LAB | Facility: HOSPITAL | Age: 45
End: 2025-02-05
Payer: COMMERCIAL

## 2025-02-05 DIAGNOSIS — E03.9 HYPOTHYROIDISM, UNSPECIFIED TYPE: Primary | ICD-10-CM

## 2025-02-05 DIAGNOSIS — E03.9 HYPOTHYROIDISM, UNSPECIFIED TYPE: ICD-10-CM

## 2025-02-05 LAB
25(OH)D3 SERPL-MCNC: 53.7 NG/ML (ref 30–100)
ANION GAP SERPL CALCULATED.3IONS-SCNC: 9.2 MMOL/L (ref 5–15)
BUN SERPL-MCNC: 9 MG/DL (ref 6–20)
BUN/CREAT SERPL: 8.7 (ref 7–25)
CALCIUM SPEC-SCNC: 11.2 MG/DL (ref 8.6–10.5)
CHLORIDE SERPL-SCNC: 103 MMOL/L (ref 98–107)
CO2 SERPL-SCNC: 25.8 MMOL/L (ref 22–29)
CREAT SERPL-MCNC: 1.04 MG/DL (ref 0.57–1)
EGFRCR SERPLBLD CKD-EPI 2021: 67.3 ML/MIN/1.73
GLUCOSE SERPL-MCNC: 108 MG/DL (ref 65–99)
POTASSIUM SERPL-SCNC: 4 MMOL/L (ref 3.5–5.2)
SODIUM SERPL-SCNC: 138 MMOL/L (ref 136–145)
T4 FREE SERPL-MCNC: 1.72 NG/DL (ref 0.92–1.68)
TSH SERPL DL<=0.05 MIU/L-ACNC: 0.1 UIU/ML (ref 0.27–4.2)

## 2025-02-05 PROCEDURE — 82306 VITAMIN D 25 HYDROXY: CPT | Performed by: INTERNAL MEDICINE

## 2025-02-05 PROCEDURE — 80048 BASIC METABOLIC PNL TOTAL CA: CPT | Performed by: INTERNAL MEDICINE

## 2025-02-05 PROCEDURE — 84439 ASSAY OF FREE THYROXINE: CPT | Performed by: INTERNAL MEDICINE

## 2025-02-05 PROCEDURE — 84443 ASSAY THYROID STIM HORMONE: CPT | Performed by: INTERNAL MEDICINE

## 2025-02-05 NOTE — PROGRESS NOTES
"GYN new patient    CC: LSE    Tobacco/Nicotine use:  former    HPI:   44 y.o. Contraception or HRT: Contraception:  Paragard IUD  Menses:   q sporadic days, lasts 3 days, changes products q 4-6 hrs on heaviest days.   Pain:  None    Pt states she has been diagnosed with LSE by another gyn in the past.  She would like to have a second opinion.  She is currently treating it PRN .  Also c/o vaginal dryness and dyspareunia as well as nocturia.  Pt states she didn't have a vulvar biopsy as recommended because she declined it      History: PMHx, Meds, Allergies, PSHx, Social Hx, and POBHx all reviewed and updated.  PCP:Virgilio Krause MD      Review of Systems     /68   Pulse 66   Ht 170.2 cm (67.01\")   Wt 88.5 kg (195 lb)   LMP  (LMP Unknown) Comment: Paragard  BMI 30.53 kg/m²     Physical Exam  Vitals and nursing note reviewed. Exam conducted with a chaperone present.   Genitourinary:     Exam position: Lithotomy position.      Vagina: No lesions.             ASSESSMENT AND PLAN:  Problem Visit    Diagnoses and all orders for this visit:    1. Lichen sclerosus of female genitalia (Primary)  Assessment & Plan:  Patient's exam is consistent with the previous diagnosis of lichen sclerosus.  She is not currently doing maintenance therapy.  I do recommend she do maintenance therapy 3 times weekly with clobetasol.  Patient was counseled regarding improving efficacy with sitz bath's for 15 minutes prior to application of clobetasol on the massage and clobetasol into the skin thoroughly for 90 to 120 seconds.  Patient was also counseled that a vulvar biopsy is the standard way to diagnose lichen sclerosus    Orders:  -     clobetasol (TEMOVATE) 0.05 % ointment; Apply to affected area 3x/week.  Continue to soak in clean, warm water for 15 min prior to application and massage into the skin for  seconds  Dispense: 60 g; Refill: 3    2. Genitourinary syndrome of menopause  Assessment & Plan:  Patient with " mild to moderate vulvovaginal atrophy.  Patient was counseled regarding the risks benefits of localized vaginal vulvar estrogen.  Patient was counseled that treating GSM would allow for improved efficacy of treating LSE    Orders:  -     estradiol (ESTRACE VAGINAL) 0.1 MG/GM vaginal cream; Place 0.5 gm PV and massage 0.5 gm to vulva and vestibule at night 2x/week  Dispense: 42.5 g; Refill: 3                Follow Up:  Return in about 3 months (around 5/10/2025).        Maryanne Joaquin MD  02/10/2025

## 2025-02-06 ENCOUNTER — TELEPHONE (OUTPATIENT)
Dept: FAMILY MEDICINE CLINIC | Age: 45
End: 2025-02-06
Payer: COMMERCIAL

## 2025-02-06 DIAGNOSIS — E21.0 PRIMARY HYPERPARATHYROIDISM: Primary | ICD-10-CM

## 2025-02-06 NOTE — TELEPHONE ENCOUNTER
Pt is asking if a PTH intact cab be added to her labs from yesterday.  She is asking if this can be done to see if her levels have changed from before.

## 2025-02-06 NOTE — TELEPHONE ENCOUNTER
I spoke to Zoe in lab, she will call lab in [place] and see if they have enough blood to add this test on.

## 2025-02-06 NOTE — TELEPHONE ENCOUNTER
I placed an order and she can come in and have her labs drawn to recheck this.  I was deferring to endocrinology to follow-up on those labs however we can check it I just would need her to come back in.

## 2025-02-07 DIAGNOSIS — F33.1 MAJOR DEPRESSIVE DISORDER, RECURRENT EPISODE, MODERATE DEGREE: ICD-10-CM

## 2025-02-07 DIAGNOSIS — F41.8 INSOMNIA SECONDARY TO DEPRESSION WITH ANXIETY: ICD-10-CM

## 2025-02-07 DIAGNOSIS — F51.05 INSOMNIA SECONDARY TO DEPRESSION WITH ANXIETY: ICD-10-CM

## 2025-02-10 ENCOUNTER — OFFICE VISIT (OUTPATIENT)
Dept: OBSTETRICS AND GYNECOLOGY | Facility: CLINIC | Age: 45
End: 2025-02-10
Payer: COMMERCIAL

## 2025-02-10 VITALS
HEIGHT: 67 IN | WEIGHT: 195 LBS | DIASTOLIC BLOOD PRESSURE: 68 MMHG | SYSTOLIC BLOOD PRESSURE: 103 MMHG | HEART RATE: 66 BPM | BODY MASS INDEX: 30.61 KG/M2

## 2025-02-10 DIAGNOSIS — N95.8 GENITOURINARY SYNDROME OF MENOPAUSE: ICD-10-CM

## 2025-02-10 DIAGNOSIS — N90.4 LICHEN SCLEROSUS OF FEMALE GENITALIA: Primary | ICD-10-CM

## 2025-02-10 RX ORDER — ESTRADIOL 0.1 MG/G
CREAM VAGINAL
Qty: 42.5 G | Refills: 3 | Status: SHIPPED | OUTPATIENT
Start: 2025-02-10

## 2025-02-10 RX ORDER — CLOBETASOL PROPIONATE 0.5 MG/G
OINTMENT TOPICAL
Qty: 60 G | Refills: 3 | Status: SHIPPED | OUTPATIENT
Start: 2025-02-10

## 2025-02-10 NOTE — ASSESSMENT & PLAN NOTE
Patient with mild to moderate vulvovaginal atrophy.  Patient was counseled regarding the risks benefits of localized vaginal vulvar estrogen.  Patient was counseled that treating GSM would allow for improved efficacy of treating LSE

## 2025-02-10 NOTE — ASSESSMENT & PLAN NOTE
Patient's exam is consistent with the previous diagnosis of lichen sclerosus.  She is not currently doing maintenance therapy.  I do recommend she do maintenance therapy 3 times weekly with clobetasol.  Patient was counseled regarding improving efficacy with sitz bath's for 15 minutes prior to application of clobetasol on the massage and clobetasol into the skin thoroughly for 90 to 120 seconds.  Patient was also counseled that a vulvar biopsy is the standard way to diagnose lichen sclerosus

## 2025-02-11 RX ORDER — TRAZODONE HYDROCHLORIDE 50 MG/1
25 TABLET, FILM COATED ORAL NIGHTLY
Qty: 30 TABLET | Refills: 3 | Status: SHIPPED | OUTPATIENT
Start: 2025-02-11

## 2025-02-12 DIAGNOSIS — K21.9 GASTROESOPHAGEAL REFLUX DISEASE WITHOUT ESOPHAGITIS: ICD-10-CM

## 2025-02-12 RX ORDER — PANTOPRAZOLE SODIUM 40 MG/1
40 TABLET, DELAYED RELEASE ORAL DAILY
Qty: 30 TABLET | Refills: 2 | Status: SHIPPED | OUTPATIENT
Start: 2025-02-12

## 2025-04-04 ENCOUNTER — TELEPHONE (OUTPATIENT)
Dept: FAMILY MEDICINE CLINIC | Age: 45
End: 2025-04-04
Payer: COMMERCIAL

## 2025-04-04 DIAGNOSIS — Z12.31 ENCOUNTER FOR SCREENING MAMMOGRAM FOR MALIGNANT NEOPLASM OF BREAST: Primary | ICD-10-CM

## 2025-04-04 NOTE — TELEPHONE ENCOUNTER
----- Message from Betty MARTINEZ sent at 3/24/2025  9:57 AM EDT -----  Regarding: FW: f/u screening mammo  Dr Krause Patient now.  ----- Message -----  From: Ann Melvin MD  Sent: 3/22/2025  12:00 AM EDT  To: [facility]  Subject: f/u screening mammo                              Please call pt to let her know that she is due for her yearly screening mammogram.  Please pend order for screening mammo and send to me.  Thanks, BZM

## 2025-04-12 ENCOUNTER — TRANSCRIBE ORDERS (OUTPATIENT)
Dept: ADMINISTRATIVE | Facility: HOSPITAL | Age: 45
End: 2025-04-12
Payer: COMMERCIAL

## 2025-04-12 DIAGNOSIS — Z12.31 ENCOUNTER FOR SCREENING MAMMOGRAM FOR MALIGNANT NEOPLASM OF BREAST: Primary | ICD-10-CM

## 2025-05-09 ENCOUNTER — OFFICE VISIT (OUTPATIENT)
Dept: FAMILY MEDICINE CLINIC | Age: 45
End: 2025-05-09
Payer: COMMERCIAL

## 2025-05-09 VITALS
TEMPERATURE: 96.8 F | HEART RATE: 81 BPM | BODY MASS INDEX: 30.45 KG/M2 | WEIGHT: 194 LBS | OXYGEN SATURATION: 99 % | HEIGHT: 67 IN | DIASTOLIC BLOOD PRESSURE: 70 MMHG | SYSTOLIC BLOOD PRESSURE: 117 MMHG

## 2025-05-09 DIAGNOSIS — E78.2 MIXED HYPERLIPIDEMIA: Primary | ICD-10-CM

## 2025-05-09 DIAGNOSIS — E21.0 PRIMARY HYPERPARATHYROIDISM: ICD-10-CM

## 2025-05-09 DIAGNOSIS — Z13.1 SCREENING FOR DIABETES MELLITUS (DM): ICD-10-CM

## 2025-05-09 PROCEDURE — 99214 OFFICE O/P EST MOD 30 MIN: CPT | Performed by: INTERNAL MEDICINE

## 2025-05-15 ENCOUNTER — PATIENT MESSAGE (OUTPATIENT)
Dept: FAMILY MEDICINE CLINIC | Age: 45
End: 2025-05-15
Payer: COMMERCIAL

## 2025-05-15 DIAGNOSIS — Z12.12 ENCOUNTER FOR COLORECTAL CANCER SCREENING: Primary | ICD-10-CM

## 2025-05-15 DIAGNOSIS — Z12.11 ENCOUNTER FOR COLORECTAL CANCER SCREENING: Primary | ICD-10-CM

## 2025-05-15 DIAGNOSIS — K21.9 GASTROESOPHAGEAL REFLUX DISEASE WITHOUT ESOPHAGITIS: ICD-10-CM

## 2025-05-15 RX ORDER — PANTOPRAZOLE SODIUM 40 MG/1
40 TABLET, DELAYED RELEASE ORAL DAILY
Qty: 30 TABLET | Refills: 2 | Status: SHIPPED | OUTPATIENT
Start: 2025-05-15

## 2025-05-27 RX ORDER — FLUOXETINE 10 MG/1
20 CAPSULE ORAL DAILY
COMMUNITY
Start: 2025-05-09

## 2025-05-27 NOTE — PROGRESS NOTES
"Chief Complaint  ADHD (3 month follow up ), Hypothyroidism, Primary hyperparathyroidism, and Situational anxiety    Subjective      Alysa Qiu is a 44 y.o. female who presents to [facility]   Patient presents for follow up regarding primary hyperparathyroidism, ADHD, and anxiety.  She is doing well and her current symptoms of anxiety are well controlled.  She is followed by psychiatry as an outpatient and they manage her medications.   Regarding the elevated calcium she has been unable to do the sestamibi scan.  I ordered the scan as the previous attempted scan on 1/14/2025 was at [facility] by order of endocrinology.  The study was incomplete due to anxiety and claustraphobia.  Patient has chosen to wait to complete this exam and is being monitored.  She also has hypothyroidism for which she takes synthroid.  Her most recent TSH was low at 0.097 and her Free T4 was elevated at 1.72. She is on 150mcg of synthroid.  History of Present Illness          Patient Care Team:  Virgilio Krause MD as PCP - General (Internal Medicine)  Joaquina Chacon APRN as Nurse Practitioner (Behavioral Health)  Jolynn Gonzales MD as Consulting Physician (Endocrinology)    Objective   Vital Signs:   Vitals:    05/09/25 0812   BP: 117/70   BP Location: Left arm   Patient Position: Sitting   Cuff Size: Adult   Pulse: 81   Temp: 96.8 °F (36 °C)   TempSrc: Temporal   SpO2: 99%   Weight: 88 kg (194 lb)   Height: 170.2 cm (67\")     Body mass index is 30.38 kg/m².    Wt Readings from Last 3 Encounters:   05/09/25 88 kg (194 lb)   02/10/25 88.5 kg (195 lb)   01/17/25 88.4 kg (194 lb 14.4 oz)     BP Readings from Last 3 Encounters:   05/09/25 117/70   02/10/25 103/68   01/17/25 110/61       Health Maintenance   Topic Date Due    COLORECTAL CANCER SCREENING  Never done    COVID-19 Vaccine (1 - 2024-25 season) Never done    LIPID PANEL  03/01/2025    TDAP/TD VACCINES (1 - Tdap) 06/07/2025 (Originally 7/10/1997) "    INFLUENZA VACCINE  07/01/2025    ANNUAL PHYSICAL  09/06/2025    MAMMOGRAM  03/22/2026    PAP SMEAR  01/28/2027    HEPATITIS C SCREENING  Completed    Pneumococcal Vaccine 0-49  Aged Out       Physical Exam   GEN:NAD, well nourished  HEENT:NCAT, PERRL, EOMI, OP clear, MMM  NECK:supple, no JVD, no carotid bruits  CVA:RRR s1, s2 no m/r/g  CHEST:CTA B no wheezes or rhonchi  ABD:soft, nontender, nondistended +bs  EXT:no c/c/e 2+PP B  NEURO:CN II-XII grossly nonfocal, no evidence of asterixes or tremor  PSYCH: appropriate mood and affect  :deferred  SKIN: warm, dry, intact, no lesions or rashes    Physical Exam        Result Review   The following data was reviewed by: Virgilio Krause MD on 05/09/2025:  [x]  Tests & Results  []  Hospitalization/Emergency Department/Urgent Care  []  Internal/External Consultant Notes    Results        Procedures          ASSESSMENT/PLAN  Assessment & Plan  Mixed hyperlipidemia  Check fasting lipid profile prior to next visit.  LDL of 148.  Not on statin  Orders:    CBC & Differential; Future    Lipid Panel; Future    Comprehensive Metabolic Panel; Future    Primary hyperparathyroidism  Monitor calcium levels.  Will hold off on further testing at this time.  Asymptomatic  Orders:    CBC & Differential; Future    Comprehensive Metabolic Panel; Future    Screening for diabetes mellitus (DM)  Heck hemoglobin a1c  Orders:    Hemoglobin A1c; Future        Assessment & Plan                FOLLOW UP  No follow-ups on file.  Patient was given instructions and counseling regarding her condition or for health maintenance advice. Please see specific information pulled into the AVS if appropriate.         Virgilio Krause MD  05/27/25  07:05 EDT        Answers submitted by the patient for this visit:  Depression (Submitted on 5/9/2025)  Chief Complaint: Depression  Visit: follow-up  Frequency: most days  Severity: moderate  confusion: No  dizziness: No  excessive worry: Yes  insomnia:  No  irritability: No  malaise/fatigue: Yes  obsessions: No  hypersomnia: No  difficulty controlling mood: Yes  Medication compliance: %  Side effects: sexual problems  Aggravated by: social activities, work stress  Additional information: Have had improvement with depression and anxiety

## 2025-05-27 NOTE — ASSESSMENT & PLAN NOTE
Check fasting lipid profile prior to next visit.  LDL of 148.  Not on statin  Orders:    CBC & Differential; Future    Lipid Panel; Future    Comprehensive Metabolic Panel; Future

## 2025-05-27 NOTE — ASSESSMENT & PLAN NOTE
Monitor calcium levels.  Will hold off on further testing at this time.  Asymptomatic  Orders:    CBC & Differential; Future    Comprehensive Metabolic Panel; Future

## 2025-06-27 ENCOUNTER — RESULTS FOLLOW-UP (OUTPATIENT)
Dept: ADMINISTRATIVE | Facility: HOSPITAL | Age: 45
End: 2025-06-27
Payer: COMMERCIAL

## 2025-06-27 ENCOUNTER — HOSPITAL ENCOUNTER (OUTPATIENT)
Dept: MAMMOGRAPHY | Facility: HOSPITAL | Age: 45
Discharge: HOME | End: 2025-06-27
Admitting: INTERNAL MEDICINE
Payer: COMMERCIAL

## 2025-06-27 DIAGNOSIS — Z12.31 ENCOUNTER FOR SCREENING MAMMOGRAM FOR MALIGNANT NEOPLASM OF BREAST: ICD-10-CM

## 2025-06-27 PROCEDURE — 77067 SCR MAMMO BI INCL CAD: CPT

## 2025-06-27 PROCEDURE — 77063 BREAST TOMOSYNTHESIS BI: CPT
